# Patient Record
Sex: FEMALE | Race: WHITE | NOT HISPANIC OR LATINO | Employment: FULL TIME | URBAN - METROPOLITAN AREA
[De-identification: names, ages, dates, MRNs, and addresses within clinical notes are randomized per-mention and may not be internally consistent; named-entity substitution may affect disease eponyms.]

---

## 2017-02-24 ENCOUNTER — ANESTHESIA (OUTPATIENT)
Dept: PERIOP | Facility: HOSPITAL | Age: 32
End: 2017-02-24

## 2017-02-24 ENCOUNTER — HOSPITAL ENCOUNTER (OUTPATIENT)
Facility: HOSPITAL | Age: 32
Setting detail: HOSPITAL OUTPATIENT SURGERY
Discharge: HOME | End: 2017-02-24
Attending: OBSTETRICS & GYNECOLOGY | Admitting: OBSTETRICS & GYNECOLOGY

## 2017-02-24 ENCOUNTER — ANESTHESIA EVENT (OUTPATIENT)
Dept: PERIOP | Facility: HOSPITAL | Age: 32
End: 2017-02-24

## 2017-02-24 VITALS
DIASTOLIC BLOOD PRESSURE: 60 MMHG | HEART RATE: 68 BPM | BODY MASS INDEX: 24.52 KG/M2 | WEIGHT: 143.6 LBS | HEIGHT: 64 IN | RESPIRATION RATE: 18 BRPM | TEMPERATURE: 98 F | OXYGEN SATURATION: 100 % | SYSTOLIC BLOOD PRESSURE: 105 MMHG

## 2017-02-24 DIAGNOSIS — O02.1 MISSED AB: ICD-10-CM

## 2017-02-24 LAB
ABO GROUP BLD: NORMAL
BASOPHILS # BLD AUTO: 0.03 10*3/MM3 (ref 0–0.2)
BASOPHILS NFR BLD AUTO: 0.4 % (ref 0–1.5)
BLD GP AB SCN SERPL QL: NEGATIVE
DEPRECATED RDW RBC AUTO: 43.3 FL (ref 37–54)
EOSINOPHIL # BLD AUTO: 0.14 10*3/MM3 (ref 0–0.7)
EOSINOPHIL NFR BLD AUTO: 1.7 % (ref 0.3–6.2)
ERYTHROCYTE [DISTWIDTH] IN BLOOD BY AUTOMATED COUNT: 13.2 % (ref 11.7–13)
HCT VFR BLD AUTO: 36 % (ref 35.6–45.5)
HGB BLD-MCNC: 12.2 G/DL (ref 11.9–15.5)
IMM GRANULOCYTES # BLD: 0.03 10*3/MM3 (ref 0–0.03)
IMM GRANULOCYTES NFR BLD: 0.4 % (ref 0–0.5)
LYMPHOCYTES # BLD AUTO: 1.91 10*3/MM3 (ref 0.9–4.8)
LYMPHOCYTES NFR BLD AUTO: 22.9 % (ref 19.6–45.3)
MCH RBC QN AUTO: 30.4 PG (ref 26.9–32)
MCHC RBC AUTO-ENTMCNC: 33.9 G/DL (ref 32.4–36.3)
MCV RBC AUTO: 89.8 FL (ref 80.5–98.2)
MONOCYTES # BLD AUTO: 0.72 10*3/MM3 (ref 0.2–1.2)
MONOCYTES NFR BLD AUTO: 8.6 % (ref 5–12)
NEUTROPHILS # BLD AUTO: 5.52 10*3/MM3 (ref 1.9–8.1)
NEUTROPHILS NFR BLD AUTO: 66 % (ref 42.7–76)
PLATELET # BLD AUTO: 246 10*3/MM3 (ref 140–500)
PMV BLD AUTO: 11.2 FL (ref 6–12)
RBC # BLD AUTO: 4.01 10*6/MM3 (ref 3.9–5.2)
RH BLD: POSITIVE
WBC NRBC COR # BLD: 8.35 10*3/MM3 (ref 4.5–10.7)

## 2017-02-24 PROCEDURE — 25010000002 KETOROLAC TROMETHAMINE PER 15 MG: Performed by: NURSE ANESTHETIST, CERTIFIED REGISTERED

## 2017-02-24 PROCEDURE — 88305 TISSUE EXAM BY PATHOLOGIST: CPT | Performed by: OBSTETRICS & GYNECOLOGY

## 2017-02-24 PROCEDURE — 85025 COMPLETE CBC W/AUTO DIFF WBC: CPT | Performed by: OBSTETRICS & GYNECOLOGY

## 2017-02-24 PROCEDURE — 25010000002 ONDANSETRON PER 1 MG: Performed by: NURSE ANESTHETIST, CERTIFIED REGISTERED

## 2017-02-24 PROCEDURE — 25010000002 DEXAMETHASONE PER 1 MG: Performed by: NURSE ANESTHETIST, CERTIFIED REGISTERED

## 2017-02-24 PROCEDURE — 25010000002 FENTANYL CITRATE (PF) 100 MCG/2ML SOLUTION: Performed by: NURSE ANESTHETIST, CERTIFIED REGISTERED

## 2017-02-24 PROCEDURE — 86850 RBC ANTIBODY SCREEN: CPT

## 2017-02-24 PROCEDURE — 25010000002 PROPOFOL 10 MG/ML EMULSION: Performed by: NURSE ANESTHETIST, CERTIFIED REGISTERED

## 2017-02-24 PROCEDURE — 25010000002 HYDROMORPHONE PER 4 MG: Performed by: NURSE ANESTHETIST, CERTIFIED REGISTERED

## 2017-02-24 PROCEDURE — 86901 BLOOD TYPING SEROLOGIC RH(D): CPT

## 2017-02-24 PROCEDURE — 25010000002 MIDAZOLAM PER 1 MG: Performed by: ANESTHESIOLOGY

## 2017-02-24 PROCEDURE — 86900 BLOOD TYPING SEROLOGIC ABO: CPT

## 2017-02-24 RX ORDER — LIDOCAINE HYDROCHLORIDE 10 MG/ML
INJECTION, SOLUTION EPIDURAL; INFILTRATION; INTRACAUDAL; PERINEURAL AS NEEDED
Status: DISCONTINUED | OUTPATIENT
Start: 2017-02-24 | End: 2017-02-24 | Stop reason: HOSPADM

## 2017-02-24 RX ORDER — FENTANYL CITRATE 50 UG/ML
INJECTION, SOLUTION INTRAMUSCULAR; INTRAVENOUS AS NEEDED
Status: DISCONTINUED | OUTPATIENT
Start: 2017-02-24 | End: 2017-02-24 | Stop reason: SURG

## 2017-02-24 RX ORDER — LIDOCAINE HYDROCHLORIDE 10 MG/ML
5 INJECTION, SOLUTION EPIDURAL; INFILTRATION; INTRACAUDAL; PERINEURAL ONCE
Status: DISCONTINUED | OUTPATIENT
Start: 2017-02-24 | End: 2017-02-24 | Stop reason: HOSPADM

## 2017-02-24 RX ORDER — HYDROMORPHONE HYDROCHLORIDE 1 MG/ML
0.5 INJECTION, SOLUTION INTRAMUSCULAR; INTRAVENOUS; SUBCUTANEOUS
Status: DISCONTINUED | OUTPATIENT
Start: 2017-02-24 | End: 2017-02-24 | Stop reason: HOSPADM

## 2017-02-24 RX ORDER — SODIUM CHLORIDE, SODIUM LACTATE, POTASSIUM CHLORIDE, CALCIUM CHLORIDE 600; 310; 30; 20 MG/100ML; MG/100ML; MG/100ML; MG/100ML
9 INJECTION, SOLUTION INTRAVENOUS CONTINUOUS
Status: DISCONTINUED | OUTPATIENT
Start: 2017-02-24 | End: 2017-02-24 | Stop reason: HOSPADM

## 2017-02-24 RX ORDER — KETOROLAC TROMETHAMINE 30 MG/ML
INJECTION, SOLUTION INTRAMUSCULAR; INTRAVENOUS AS NEEDED
Status: DISCONTINUED | OUTPATIENT
Start: 2017-02-24 | End: 2017-02-24 | Stop reason: SURG

## 2017-02-24 RX ORDER — MAGNESIUM HYDROXIDE 1200 MG/15ML
LIQUID ORAL AS NEEDED
Status: DISCONTINUED | OUTPATIENT
Start: 2017-02-24 | End: 2017-02-24 | Stop reason: HOSPADM

## 2017-02-24 RX ORDER — PROMETHAZINE HYDROCHLORIDE 25 MG/ML
12.5 INJECTION, SOLUTION INTRAMUSCULAR; INTRAVENOUS ONCE AS NEEDED
Status: DISCONTINUED | OUTPATIENT
Start: 2017-02-24 | End: 2017-02-24 | Stop reason: HOSPADM

## 2017-02-24 RX ORDER — OXYCODONE AND ACETAMINOPHEN 7.5; 325 MG/1; MG/1
1 TABLET ORAL ONCE AS NEEDED
Status: DISCONTINUED | OUTPATIENT
Start: 2017-02-24 | End: 2017-02-24 | Stop reason: HOSPADM

## 2017-02-24 RX ORDER — FAMOTIDINE 10 MG/ML
20 INJECTION, SOLUTION INTRAVENOUS ONCE
Status: COMPLETED | OUTPATIENT
Start: 2017-02-24 | End: 2017-02-24

## 2017-02-24 RX ORDER — DIPHENHYDRAMINE HYDROCHLORIDE 50 MG/ML
12.5 INJECTION INTRAMUSCULAR; INTRAVENOUS
Status: DISCONTINUED | OUTPATIENT
Start: 2017-02-24 | End: 2017-02-24 | Stop reason: HOSPADM

## 2017-02-24 RX ORDER — SODIUM CHLORIDE 0.9 % (FLUSH) 0.9 %
1-10 SYRINGE (ML) INJECTION AS NEEDED
Status: DISCONTINUED | OUTPATIENT
Start: 2017-02-24 | End: 2017-02-24 | Stop reason: HOSPADM

## 2017-02-24 RX ORDER — HYDROMORPHONE HYDROCHLORIDE 1 MG/ML
0.25 INJECTION, SOLUTION INTRAMUSCULAR; INTRAVENOUS; SUBCUTANEOUS
Status: DISCONTINUED | OUTPATIENT
Start: 2017-02-24 | End: 2017-02-24 | Stop reason: HOSPADM

## 2017-02-24 RX ORDER — LABETALOL HYDROCHLORIDE 5 MG/ML
5 INJECTION, SOLUTION INTRAVENOUS
Status: DISCONTINUED | OUTPATIENT
Start: 2017-02-24 | End: 2017-02-24 | Stop reason: HOSPADM

## 2017-02-24 RX ORDER — MIDAZOLAM HYDROCHLORIDE 1 MG/ML
2 INJECTION INTRAMUSCULAR; INTRAVENOUS
Status: DISCONTINUED | OUTPATIENT
Start: 2017-02-24 | End: 2017-02-24 | Stop reason: HOSPADM

## 2017-02-24 RX ORDER — LIDOCAINE HYDROCHLORIDE 20 MG/ML
INJECTION, SOLUTION INFILTRATION; PERINEURAL AS NEEDED
Status: DISCONTINUED | OUTPATIENT
Start: 2017-02-24 | End: 2017-02-24 | Stop reason: SURG

## 2017-02-24 RX ORDER — NALOXONE HCL 0.4 MG/ML
0.2 VIAL (ML) INJECTION AS NEEDED
Status: DISCONTINUED | OUTPATIENT
Start: 2017-02-24 | End: 2017-02-24 | Stop reason: HOSPADM

## 2017-02-24 RX ORDER — PROPOFOL 10 MG/ML
VIAL (ML) INTRAVENOUS AS NEEDED
Status: DISCONTINUED | OUTPATIENT
Start: 2017-02-24 | End: 2017-02-24 | Stop reason: SURG

## 2017-02-24 RX ORDER — DEXAMETHASONE SODIUM PHOSPHATE 10 MG/ML
INJECTION INTRAMUSCULAR; INTRAVENOUS AS NEEDED
Status: DISCONTINUED | OUTPATIENT
Start: 2017-02-24 | End: 2017-02-24 | Stop reason: SURG

## 2017-02-24 RX ORDER — ONDANSETRON 2 MG/ML
INJECTION INTRAMUSCULAR; INTRAVENOUS AS NEEDED
Status: DISCONTINUED | OUTPATIENT
Start: 2017-02-24 | End: 2017-02-24 | Stop reason: SURG

## 2017-02-24 RX ORDER — PROMETHAZINE HYDROCHLORIDE 25 MG/1
12.5 TABLET ORAL ONCE AS NEEDED
Status: DISCONTINUED | OUTPATIENT
Start: 2017-02-24 | End: 2017-02-24 | Stop reason: HOSPADM

## 2017-02-24 RX ORDER — FLUMAZENIL 0.1 MG/ML
0.2 INJECTION INTRAVENOUS AS NEEDED
Status: DISCONTINUED | OUTPATIENT
Start: 2017-02-24 | End: 2017-02-24 | Stop reason: HOSPADM

## 2017-02-24 RX ORDER — HYDRALAZINE HYDROCHLORIDE 20 MG/ML
5 INJECTION INTRAMUSCULAR; INTRAVENOUS
Status: DISCONTINUED | OUTPATIENT
Start: 2017-02-24 | End: 2017-02-24 | Stop reason: HOSPADM

## 2017-02-24 RX ORDER — FENTANYL CITRATE 50 UG/ML
50 INJECTION, SOLUTION INTRAMUSCULAR; INTRAVENOUS
Status: DISCONTINUED | OUTPATIENT
Start: 2017-02-24 | End: 2017-02-24 | Stop reason: HOSPADM

## 2017-02-24 RX ORDER — HYDROCODONE BITARTRATE AND ACETAMINOPHEN 7.5; 325 MG/1; MG/1
1 TABLET ORAL ONCE AS NEEDED
Status: COMPLETED | OUTPATIENT
Start: 2017-02-24 | End: 2017-02-24

## 2017-02-24 RX ORDER — ONDANSETRON 2 MG/ML
4 INJECTION INTRAMUSCULAR; INTRAVENOUS ONCE AS NEEDED
Status: DISCONTINUED | OUTPATIENT
Start: 2017-02-24 | End: 2017-02-24 | Stop reason: HOSPADM

## 2017-02-24 RX ORDER — PROMETHAZINE HYDROCHLORIDE 25 MG/1
25 SUPPOSITORY RECTAL ONCE AS NEEDED
Status: DISCONTINUED | OUTPATIENT
Start: 2017-02-24 | End: 2017-02-24 | Stop reason: HOSPADM

## 2017-02-24 RX ORDER — PROMETHAZINE HYDROCHLORIDE 25 MG/1
25 TABLET ORAL ONCE AS NEEDED
Status: DISCONTINUED | OUTPATIENT
Start: 2017-02-24 | End: 2017-02-24 | Stop reason: HOSPADM

## 2017-02-24 RX ORDER — MIDAZOLAM HYDROCHLORIDE 1 MG/ML
1 INJECTION INTRAMUSCULAR; INTRAVENOUS
Status: DISCONTINUED | OUTPATIENT
Start: 2017-02-24 | End: 2017-02-24 | Stop reason: HOSPADM

## 2017-02-24 RX ADMIN — DEXAMETHASONE SODIUM PHOSPHATE 8 MG: 10 INJECTION INTRAMUSCULAR; INTRAVENOUS at 12:26

## 2017-02-24 RX ADMIN — PROPOFOL 200 MG: 10 INJECTION, EMULSION INTRAVENOUS at 12:18

## 2017-02-24 RX ADMIN — FENTANYL CITRATE 50 MCG: 50 INJECTION INTRAMUSCULAR; INTRAVENOUS at 12:18

## 2017-02-24 RX ADMIN — FAMOTIDINE 20 MG: 10 INJECTION, SOLUTION INTRAVENOUS at 11:16

## 2017-02-24 RX ADMIN — MIDAZOLAM 1 MG: 1 INJECTION INTRAMUSCULAR; INTRAVENOUS at 11:17

## 2017-02-24 RX ADMIN — HYDROCODONE BITARTRATE AND ACETAMINOPHEN 1 TABLET: 7.5; 325 TABLET ORAL at 13:17

## 2017-02-24 RX ADMIN — KETOROLAC TROMETHAMINE 30 MG: 30 INJECTION, SOLUTION INTRAMUSCULAR; INTRAVENOUS at 12:34

## 2017-02-24 RX ADMIN — ONDANSETRON 8 MG: 2 INJECTION INTRAMUSCULAR; INTRAVENOUS at 12:26

## 2017-02-24 RX ADMIN — HYDROMORPHONE HYDROCHLORIDE 0.5 MG: 1 INJECTION, SOLUTION INTRAMUSCULAR; INTRAVENOUS; SUBCUTANEOUS at 12:57

## 2017-02-24 RX ADMIN — SODIUM CHLORIDE, POTASSIUM CHLORIDE, SODIUM LACTATE AND CALCIUM CHLORIDE 9 ML/HR: 600; 310; 30; 20 INJECTION, SOLUTION INTRAVENOUS at 11:16

## 2017-02-24 RX ADMIN — LIDOCAINE HYDROCHLORIDE 100 MG: 20 INJECTION, SOLUTION INFILTRATION; PERINEURAL at 12:18

## 2017-02-24 NOTE — PLAN OF CARE
Problem: Patient Care Overview (Adult)  Goal: Plan of Care Review  Outcome: Outcome(s) achieved Date Met:  02/24/17 02/24/17 1424   Coping/Psychosocial Response Interventions   Plan Of Care Reviewed With patient;spouse   Outcome Evaluation   Outcome Summary/Follow up Plan READY FIR DISCHARGE

## 2017-02-24 NOTE — PLAN OF CARE
Problem: Patient Care Overview (Adult)  Goal: Discharge Needs Assessment  Outcome: Outcome(s) achieved Date Met:  02/24/17

## 2017-02-24 NOTE — ANESTHESIA PREPROCEDURE EVALUATION
Anesthesia Evaluation     Patient summary reviewed and Nursing notes reviewed   no history of anesthetic complications:  NPO Status: > 8 hours   Airway   Mallampati: II  TM distance: >3 FB  Neck ROM: full  no difficulty expected  Dental - normal exam     Pulmonary - negative pulmonary ROS and normal exam    breath sounds clear to auscultation  (-) rhonchi, decreased breath sounds, wheezes, rales, stridor  Cardiovascular - negative cardio ROS and normal exam    Rhythm: regular  Rate: normal    (-) murmur, weak pulses, friction rub, systolic click, carotid bruits, JVD, peripheral edema      Neuro/Psych- negative ROS  GI/Hepatic/Renal/Endo - negative ROS     Musculoskeletal (-) negative ROS    Abdominal  - normal exam    Abdomen: soft.   Substance History - negative use     OB/GYN    (+) Pregnant,         Other - negative ROS                                   Anesthesia Plan    ASA 2     general     intravenous induction   Anesthetic plan and risks discussed with patient and spouse/significant other.

## 2017-02-24 NOTE — PLAN OF CARE
Problem: Perioperative Period (Adult)  Goal: Signs and Symptoms of Listed Potential Problems Will be Absent or Manageable (Perioperative Period)  Outcome: Outcome(s) achieved Date Met:  02/24/17 02/24/17 1424   Perioperative Period   Problems Assessed (Perioperative Period) all   Problems Present (Perioperative Period) none

## 2017-02-24 NOTE — PLAN OF CARE
Problem: Patient Care Overview (Adult)  Goal: Adult Individualization and Mutuality  Outcome: Outcome(s) achieved Date Met:  02/24/17

## 2017-02-24 NOTE — PLAN OF CARE
Problem: Patient Care Overview (Adult)  Goal: Plan of Care Review  Outcome: Ongoing (interventions implemented as appropriate)    02/24/17 1003   Coping/Psychosocial Response Interventions   Plan Of Care Reviewed With patient   Patient Care Overview   Progress no change       Goal: Adult Individualization and Mutuality  Outcome: Ongoing (interventions implemented as appropriate)    02/24/17 1003   Individualization   Patient Specific Preferences none       Goal: Discharge Needs Assessment  Outcome: Ongoing (interventions implemented as appropriate)    02/24/17 1003   Discharge Needs Assessment   Concerns To Be Addressed no discharge needs identified         Problem: Perioperative Period (Adult)  Goal: Signs and Symptoms of Listed Potential Problems Will be Absent or Manageable (Perioperative Period)  Outcome: Ongoing (interventions implemented as appropriate)    02/24/17 1003   Perioperative Period   Problems Assessed (Perioperative Period) all   Problems Present (Perioperative Period) none

## 2017-02-24 NOTE — ANESTHESIA PROCEDURE NOTES
Airway  Urgency: elective    Date/Time: 2/24/2017 12:37 PM  End Time:2/24/2017 12:37 PM  Airway not difficult    General Information and Staff    Patient location during procedure: OR  Anesthesiologist: DENISA KEYS  CRNA: FABIANA THIBODEAUX    Indications and Patient Condition  Indications for airway management: airway protection    Preoxygenated: yes  MILS maintained throughout  Mask difficulty assessment: 1 - vent by mask    Final Airway Details  Final airway type: supraglottic airway      Successful airway: oropharyngeal  Size 4    Number of attempts at approach: 1

## 2017-02-24 NOTE — ANESTHESIA POSTPROCEDURE EVALUATION
Patient: Cecille BILLS    Procedure Summary     Date Anesthesia Start Anesthesia Stop Room / Location    02/24/17 1213 1249  [facility] / [facility]       Procedure Diagnosis Surgeon Provider    DILATATION AND CURETTAGE WITH SUCTION--6.1 weeks gestation (N/A Vagina) No diagnosis on file. MD Khari Drew MD          Anesthesia Type: general  Last vitals  /67 (02/24/17 1340)    Temp      Pulse 63 (02/24/17 1340)   Resp 16 (02/24/17 1340)    SpO2 100 % (02/24/17 1340)      Post Anesthesia Care and Evaluation    Patient location during evaluation: bedside  Patient participation: complete - patient participated  Level of consciousness: awake and alert  Pain management: adequate  Airway patency: patent  Anesthetic complications: No anesthetic complications    Cardiovascular status: acceptable  Respiratory status: acceptable  Hydration status: acceptable

## 2017-02-24 NOTE — H&P
Chart rev'd. No changes. Rh positive. Has 6wk loss on sono. No ca x2 now. No vb. Retrovert uterus. First loss. dw risks and recovery issues. zia

## 2017-02-25 LAB
CYTO UR: NORMAL
LAB AP CASE REPORT: NORMAL
Lab: NORMAL
PATH REPORT.FINAL DX SPEC: NORMAL
PATH REPORT.GROSS SPEC: NORMAL

## 2017-02-25 NOTE — OP NOTE
Date of Procedure:  2017     PREOPERATIVE DIAGNOSIS: A 31-year-old  1 at 6 weeks with missed .     POSTOPERATIVE DIAGNOSIS: A 31-year-old  1 at 6 weeks with missed .     PROCEDURES PERFORMED:  1.  Suction dilatation and curettage.   2.  Paracervical block.     SURGEON: Maureen York MD      ASSISTANT: None.      COMPLICATIONS: None.      ANESTHESIA: General.      ESTIMATED BLOOD LOSS: 200 mL.      FINDINGS: Obvious products of conception.     DESCRIPTION OF PROCEDURE: The patient was taken to the operating room where general anesthesia was obtained without difficulty. She was placed in the dorsal lithotomy position in universal Huber stirrups and prepped and draped in the standard sterile fashion. Paracervical block was performed using 10 mL of 1% plain lidocaine at 3 and 9 oclock positions. The cervix was easily dilated. A #7 curved suction cannula was inserted into the cavity with obvious products of conception returned. Several passes were performed until no further products were noted.  A #1 curette was used. A gritty texture was noted throughout and the procedure was terminated. All needle, lap, and sponge counts were correct.          KATARINA Bruno:wqaas  D:   2017 13:57:34  T:   2017 19:42:30  Job ID:   [id number]  Document ID:   [id number]  cc:

## 2017-04-14 DIAGNOSIS — Z00.00 HEALTH CARE MAINTENANCE: ICD-10-CM

## 2017-04-14 DIAGNOSIS — Z00.00 ANNUAL PHYSICAL EXAM: Primary | ICD-10-CM

## 2017-04-15 LAB
ALBUMIN SERPL-MCNC: 4.6 G/DL (ref 3.5–5.2)
ALBUMIN/GLOB SERPL: 1.5 G/DL
ALP SERPL-CCNC: 79 U/L (ref 39–117)
ALT SERPL-CCNC: 40 U/L (ref 1–33)
AST SERPL-CCNC: 45 U/L (ref 1–32)
BASOPHILS # BLD AUTO: 0.1 10*3/MM3 (ref 0–0.2)
BASOPHILS NFR BLD AUTO: 1.4 % (ref 0–1.5)
BILIRUB SERPL-MCNC: 0.4 MG/DL (ref 0.1–1.2)
BUN SERPL-MCNC: 13 MG/DL (ref 6–20)
BUN/CREAT SERPL: 14.9 (ref 7–25)
CALCIUM SERPL-MCNC: 9.8 MG/DL (ref 8.6–10.5)
CHLORIDE SERPL-SCNC: 103 MMOL/L (ref 98–107)
CHOLEST SERPL-MCNC: 195 MG/DL (ref 0–200)
CO2 SERPL-SCNC: 27.6 MMOL/L (ref 22–29)
CREAT SERPL-MCNC: 0.87 MG/DL (ref 0.57–1)
EOSINOPHIL # BLD AUTO: 0.22 10*3/MM3 (ref 0–0.7)
EOSINOPHIL NFR BLD AUTO: 3.1 % (ref 0.3–6.2)
ERYTHROCYTE [DISTWIDTH] IN BLOOD BY AUTOMATED COUNT: 14.2 % (ref 11.7–13)
GLOBULIN SER CALC-MCNC: 3 GM/DL
GLUCOSE SERPL-MCNC: 83 MG/DL (ref 65–99)
HCT VFR BLD AUTO: 44 % (ref 35.6–45.5)
HDLC SERPL-MCNC: 88 MG/DL (ref 40–60)
HGB BLD-MCNC: 13.7 G/DL (ref 11.9–15.5)
IMM GRANULOCYTES # BLD: 0.03 10*3/MM3 (ref 0–0.03)
IMM GRANULOCYTES NFR BLD: 0.4 % (ref 0–0.5)
LDLC SERPL CALC-MCNC: 94 MG/DL (ref 0–100)
LYMPHOCYTES # BLD AUTO: 2.39 10*3/MM3 (ref 0.9–4.8)
LYMPHOCYTES NFR BLD AUTO: 33.9 % (ref 19.6–45.3)
MCH RBC QN AUTO: 29.7 PG (ref 26.9–32)
MCHC RBC AUTO-ENTMCNC: 31.1 G/DL (ref 32.4–36.3)
MCV RBC AUTO: 95.4 FL (ref 80.5–98.2)
MONOCYTES # BLD AUTO: 0.64 10*3/MM3 (ref 0.2–1.2)
MONOCYTES NFR BLD AUTO: 9.1 % (ref 5–12)
NEUTROPHILS # BLD AUTO: 3.66 10*3/MM3 (ref 1.9–8.1)
NEUTROPHILS NFR BLD AUTO: 52.1 % (ref 42.7–76)
NRBC BLD AUTO-RTO: 0 /100 WBC (ref 0–0)
PLATELET # BLD AUTO: 319 10*3/MM3 (ref 140–500)
POTASSIUM SERPL-SCNC: 4.8 MMOL/L (ref 3.5–5.2)
PROT SERPL-MCNC: 7.6 G/DL (ref 6–8.5)
RBC # BLD AUTO: 4.61 10*6/MM3 (ref 3.9–5.2)
SODIUM SERPL-SCNC: 142 MMOL/L (ref 136–145)
TRIGL SERPL-MCNC: 64 MG/DL (ref 0–150)
TSH SERPL DL<=0.005 MIU/L-ACNC: 3.05 MIU/ML (ref 0.27–4.2)
VLDLC SERPL CALC-MCNC: 12.8 MG/DL (ref 5–40)
WBC # BLD AUTO: 7.04 10*3/MM3 (ref 4.5–10.7)

## 2017-04-24 ENCOUNTER — OFFICE VISIT (OUTPATIENT)
Dept: INTERNAL MEDICINE | Facility: CLINIC | Age: 32
End: 2017-04-24

## 2017-04-24 VITALS
OXYGEN SATURATION: 96 % | HEIGHT: 64 IN | BODY MASS INDEX: 25.4 KG/M2 | HEART RATE: 77 BPM | SYSTOLIC BLOOD PRESSURE: 108 MMHG | WEIGHT: 148.8 LBS | DIASTOLIC BLOOD PRESSURE: 60 MMHG | TEMPERATURE: 98.3 F

## 2017-04-24 DIAGNOSIS — R74.8 ELEVATED LIVER ENZYMES: ICD-10-CM

## 2017-04-24 DIAGNOSIS — F41.9 ANXIETY: ICD-10-CM

## 2017-04-24 DIAGNOSIS — R10.30 LOWER ABDOMINAL PAIN: Primary | ICD-10-CM

## 2017-04-24 LAB
B-HCG UR QL: NEGATIVE
BILIRUB BLD-MCNC: NEGATIVE MG/DL
CLARITY, POC: CLEAR
COLOR UR: YELLOW
GLUCOSE UR STRIP-MCNC: NEGATIVE MG/DL
INTERNAL NEGATIVE CONTROL: NEGATIVE
INTERNAL POSITIVE CONTROL: POSITIVE
KETONES UR QL: NEGATIVE
LEUKOCYTE EST, POC: NEGATIVE
Lab: NORMAL
NITRITE UR-MCNC: NEGATIVE MG/ML
PH UR: 5.5 [PH] (ref 5–8)
PROT UR STRIP-MCNC: NEGATIVE MG/DL
RBC # UR STRIP: ABNORMAL /UL
SP GR UR: 1 (ref 1–1.03)
UROBILINOGEN UR QL: NORMAL

## 2017-04-24 PROCEDURE — 81025 URINE PREGNANCY TEST: CPT | Performed by: NURSE PRACTITIONER

## 2017-04-24 PROCEDURE — 81003 URINALYSIS AUTO W/O SCOPE: CPT | Performed by: NURSE PRACTITIONER

## 2017-04-24 PROCEDURE — 99214 OFFICE O/P EST MOD 30 MIN: CPT | Performed by: NURSE PRACTITIONER

## 2017-04-24 RX ORDER — IBUPROFEN 600 MG/1
600 TABLET ORAL EVERY 6 HOURS PRN
COMMUNITY
End: 2019-07-29 | Stop reason: HOSPADM

## 2017-04-24 RX ORDER — ALPRAZOLAM 0.25 MG/1
0.25 TABLET ORAL 2 TIMES DAILY PRN
Qty: 6 TABLET | Refills: 0 | Status: SHIPPED | OUTPATIENT
Start: 2017-04-24 | End: 2018-05-01 | Stop reason: SDUPTHER

## 2017-04-24 NOTE — PROGRESS NOTES
Subjective   Cecille BILLS is a 32 y.o. female who states that she started with lower abd. And back pian on the left side. She urinated this morning and felt like she still needed to go, slight u/a irritation. Pt. Is trying to become pregnant.     History of Present Illness   The patient woke up at 4 am with left sided back pain. Felt like she needed to go to the bathroom, urinated but felt like she still needed to go. Has a burning/ache in her bladder currently. Denies blood.   Had a missed  in February. Due for her period next week. She is currently trying to get pregnant.   Also will discuss labs.   Also with abd pain every 6 months. JAS WNL. Saw GI, nothing added. Notes mainly with tomato based products, better with omeprazole with this.   Notes anxiety with flying recently.     Up 10 lbs since last year. She is exercising.   The following portions of the patient's history were reviewed and updated as appropriate: allergies, current medications, past family history, past medical history, past social history, past surgical history and problem list.    Review of Systems   Constitutional: Negative.    Respiratory: Negative.    Genitourinary: Positive for flank pain and pelvic pain.   Psychiatric/Behavioral: Negative for dysphoric mood. The patient is nervous/anxious.    All other systems reviewed and are negative.      Objective   Physical Exam   Constitutional: She appears well-developed and well-nourished.   Neck: Normal range of motion. Neck supple. No thyromegaly present.   Cardiovascular: Normal rate, regular rhythm, normal heart sounds and intact distal pulses.    Pulmonary/Chest: Effort normal and breath sounds normal.   Abdominal: There is tenderness in the suprapubic area. There is no CVA tenderness.   Skin: Skin is warm and dry.   Psychiatric: She has a normal mood and affect. Her behavior is normal. Judgment and thought content normal.       Assessment/Plan   Cecille was seen today for  urinary tract infection.    Diagnoses and all orders for this visit:    Lower abdominal pain  -     POCT urinalysis dipstick, automated  -     POCT pregnancy, urine    Anxiety  -     ALPRAZolam (XANAX) 0.25 MG tablet; Take 1 tablet by mouth 2 (Two) Times a Day As Needed for Anxiety.    Elevated liver enzymes  -     Hepatitis Panel, Acute; Future      1. Left flank pain- watch for now, hydrate well. Call if symptoms persist or worsen.   2. Flight anxiety- will give PRN xanax, but needs to clear with OB before taking.   3. Elevated liver enzymes- Recheck hepatic function panel in 1 month, if still elevated will check RUQ US.

## 2017-04-26 ENCOUNTER — TELEPHONE (OUTPATIENT)
Dept: INTERNAL MEDICINE | Facility: CLINIC | Age: 32
End: 2017-04-26

## 2017-04-26 DIAGNOSIS — R30.9 URINARY PAIN: Primary | ICD-10-CM

## 2017-04-26 LAB
BACTERIA UR CULT: NO GROWTH
BACTERIA UR CULT: NORMAL

## 2017-04-26 NOTE — TELEPHONE ENCOUNTER
I CALLED PT. AND SHE STATES THAT SHE DOES NOT HAVE FLANK PAIN BUT THAT SHE IS IN PAIN AND DISCOMFORT WITH U/A SYMPTOMS AND WANTS TO BE REF TO THE SPECIALIST AND WANTS TO KNOW WHEN SHE CAN GET IN, I TOLD HER THAT I WILL SPEAK TO OUR REF SPECIALIST AND CALL HER BACK TO SEE WHEN WE CAN GET HER IN TO BE SEEN.

## 2017-04-26 NOTE — TELEPHONE ENCOUNTER
----- Message from SHARRI Griffiths sent at 4/26/2017 11:25 AM EDT -----  If pt denies flank pain and has complaints just of urinary frequency and urinary discomfort in the vaginal area she needs to be referred to Urogyn.

## 2017-04-27 ENCOUNTER — TELEPHONE (OUTPATIENT)
Dept: INTERNAL MEDICINE | Facility: CLINIC | Age: 32
End: 2017-04-27

## 2017-04-27 NOTE — TELEPHONE ENCOUNTER
I CALLED AND SCHEDULED AN APPT. FOR TODAY WITH WILLI FRANCOIS. I RELAYED TO PT, I ASKED HER TO CALL OUR OFFICE BACK AFTER HER APPT. TO LET US KNOW AFTER SHE SEES DR. GARCIA TODAY, IF SHE STILL NEEDS THE APPT. WITH DR. GE, IF NOT, WE NEED TO CANCEL THE REFERRAL, I CALLED DR. LIGHT OFFICE AND THEY CANNOT SEE HER UNTIL POSSIBLY MAY 9TH, PT. DID NOT WANT TO WAIT UNTIL THEN, SHE WANTS AN APPT. TO BE SEEN TODAY OR SHE SAID THAT SHE WILL NEED TO GO TO THE ER TODAY.   PT. STATES THAT SHE IS HAVING ONLY URINARY SYMPTOMS NOW AND SHE IS IN PAIN.

## 2017-05-17 DIAGNOSIS — R74.8 ELEVATED LIVER ENZYMES: ICD-10-CM

## 2017-05-20 LAB
HAV IGM SERPL QL IA: NEGATIVE
HBV CORE IGM SERPL QL IA: NEGATIVE
HBV SURFACE AG SERPL QL IA: NEGATIVE
HCV AB S/CO SERPL IA: <0.1 S/CO RATIO (ref 0–0.9)

## 2017-05-25 LAB
ALBUMIN SERPL-MCNC: 4.6 G/DL (ref 3.5–5.5)
ALP SERPL-CCNC: 79 IU/L (ref 39–117)
ALT SERPL-CCNC: 25 IU/L (ref 0–32)
AST SERPL-CCNC: 29 IU/L (ref 0–40)
BILIRUB DIRECT SERPL-MCNC: 0.06 MG/DL (ref 0–0.4)
BILIRUB SERPL-MCNC: <0.2 MG/DL (ref 0–1.2)
PROT SERPL-MCNC: 7.1 G/DL (ref 6–8.5)
WRITTEN AUTHORIZATION: NORMAL

## 2017-05-26 ENCOUNTER — TELEPHONE (OUTPATIENT)
Dept: INTERNAL MEDICINE | Facility: CLINIC | Age: 32
End: 2017-05-26

## 2017-10-23 DIAGNOSIS — R74.8 ELEVATED LIVER ENZYMES: Primary | ICD-10-CM

## 2017-10-24 LAB
ALBUMIN SERPL-MCNC: 4.6 G/DL (ref 3.5–5.2)
ALBUMIN/GLOB SERPL: 1.6 G/DL
ALP SERPL-CCNC: 83 U/L (ref 39–117)
ALT SERPL-CCNC: 13 U/L (ref 1–33)
AST SERPL-CCNC: 14 U/L (ref 1–32)
BILIRUB SERPL-MCNC: 0.4 MG/DL (ref 0.1–1.2)
BUN SERPL-MCNC: 12 MG/DL (ref 6–20)
BUN/CREAT SERPL: 13.6 (ref 7–25)
CALCIUM SERPL-MCNC: 10 MG/DL (ref 8.6–10.5)
CHLORIDE SERPL-SCNC: 102 MMOL/L (ref 98–107)
CO2 SERPL-SCNC: 26.9 MMOL/L (ref 22–29)
CREAT SERPL-MCNC: 0.88 MG/DL (ref 0.57–1)
GFR SERPLBLD CREATININE-BSD FMLA CKD-EPI: 75 ML/MIN/1.73
GFR SERPLBLD CREATININE-BSD FMLA CKD-EPI: 91 ML/MIN/1.73
GLOBULIN SER CALC-MCNC: 2.8 GM/DL
GLUCOSE SERPL-MCNC: 88 MG/DL (ref 65–99)
POTASSIUM SERPL-SCNC: 4.7 MMOL/L (ref 3.5–5.2)
PROT SERPL-MCNC: 7.4 G/DL (ref 6–8.5)
SODIUM SERPL-SCNC: 142 MMOL/L (ref 136–145)

## 2017-10-26 ENCOUNTER — OFFICE VISIT (OUTPATIENT)
Dept: INTERNAL MEDICINE | Facility: CLINIC | Age: 32
End: 2017-10-26

## 2017-10-26 VITALS
SYSTOLIC BLOOD PRESSURE: 92 MMHG | OXYGEN SATURATION: 97 % | HEIGHT: 64 IN | DIASTOLIC BLOOD PRESSURE: 68 MMHG | HEART RATE: 93 BPM | WEIGHT: 149 LBS | BODY MASS INDEX: 25.44 KG/M2

## 2017-10-26 DIAGNOSIS — R74.8 ELEVATED LIVER ENZYMES: Primary | ICD-10-CM

## 2017-10-26 DIAGNOSIS — E03.9 HYPOTHYROIDISM, UNSPECIFIED TYPE: ICD-10-CM

## 2017-10-26 PROBLEM — N30.10 INTERSTITIAL CYSTITIS: Status: ACTIVE | Noted: 2017-10-26

## 2017-10-26 PROCEDURE — 90686 IIV4 VACC NO PRSV 0.5 ML IM: CPT | Performed by: NURSE PRACTITIONER

## 2017-10-26 PROCEDURE — 99213 OFFICE O/P EST LOW 20 MIN: CPT | Performed by: NURSE PRACTITIONER

## 2017-10-26 PROCEDURE — 90471 IMMUNIZATION ADMIN: CPT | Performed by: NURSE PRACTITIONER

## 2017-10-26 RX ORDER — LEVOTHYROXINE SODIUM 50 MCG
TABLET ORAL
COMMUNITY
Start: 2017-10-10 | End: 2018-05-29

## 2017-10-26 RX ORDER — CHLORHEXIDINE GLUCONATE 0.12 MG/ML
RINSE ORAL
Refills: 1 | COMMUNITY
Start: 2017-08-09 | End: 2018-05-01 | Stop reason: HOSPADM

## 2017-10-26 NOTE — PROGRESS NOTES
Subjective   Cecille Devi is a 32 y.o. female here for a follow up on elevated liver enzymes.    History of Present Illness   The patient is here today to F/U on liver enzymes. She is feeling well.     Still with flight anxiety.   Saw urogyn for IC, yeni did not really help much. She states marshmallow root has really helped.     GYN- had another miscarriage early this month. Has been seeing GYN a lot. Trying progesterone now. She recently had genetic counseling, to get results today.   G-2 Spont abort-2  TSH has been checked recently and was close to 3. Recently increased to 50 mcg daily, recheck TSH soon.   The following portions of the patient's history were reviewed and updated as appropriate: allergies, current medications, past family history, past medical history, past social history, past surgical history and problem list.    Review of Systems   Constitutional: Negative.    Respiratory: Negative.    Cardiovascular: Negative.    Psychiatric/Behavioral: Negative.        Objective   Physical Exam   Constitutional: She appears well-developed and well-nourished.   Neck: Normal range of motion. Neck supple. No thyromegaly present.   Cardiovascular: Normal rate, regular rhythm, normal heart sounds and intact distal pulses.    Pulmonary/Chest: Effort normal and breath sounds normal.   Skin: Skin is warm and dry.   Psychiatric: She has a normal mood and affect. Her behavior is normal. Judgment and thought content normal.     Vitals:    10/26/17 0932   Pulse: 93   SpO2: 97%       Current Outpatient Prescriptions:   •  chlorhexidine (PERIDEX) 0.12 % solution, USE BID ON A COTTON BALL, Disp: , Rfl: 1  •  ibuprofen (ADVIL,MOTRIN) 600 MG tablet, Take 600 mg by mouth Every 6 (Six) Hours As Needed for Mild Pain (1-3)., Disp: , Rfl:   •  MULTIPLE VITAMINS PO, Take 1 tablet by mouth daily., Disp: , Rfl:   •  progesterone (PROMETRIUM) 200 MG capsule, TK 1 C PO QD BEGINNING CYCLE DAY 17 UTD, Disp: , Rfl: 3  •  SYNTHROID 50  MCG tablet, , Disp: , Rfl:   •  ALPRAZolam (XANAX) 0.25 MG tablet, Take 1 tablet by mouth 2 (Two) Times a Day As Needed for Anxiety., Disp: 6 tablet, Rfl: 0  Orders Only on 10/23/2017   Component Date Value Ref Range Status   • Glucose 10/24/2017 88  65 - 99 mg/dL Final   • BUN 10/24/2017 12  6 - 20 mg/dL Final   • Creatinine 10/24/2017 0.88  0.57 - 1.00 mg/dL Final   • eGFR Non  Am 10/24/2017 75  >60 mL/min/1.73 Final   • eGFR African Am 10/24/2017 91  >60 mL/min/1.73 Final   • BUN/Creatinine Ratio 10/24/2017 13.6  7.0 - 25.0 Final   • Sodium 10/24/2017 142  136 - 145 mmol/L Final   • Potassium 10/24/2017 4.7  3.5 - 5.2 mmol/L Final   • Chloride 10/24/2017 102  98 - 107 mmol/L Final   • Total CO2 10/24/2017 26.9  22.0 - 29.0 mmol/L Final   • Calcium 10/24/2017 10.0  8.6 - 10.5 mg/dL Final   • Total Protein 10/24/2017 7.4  6.0 - 8.5 g/dL Final   • Albumin 10/24/2017 4.60  3.50 - 5.20 g/dL Final   • Globulin 10/24/2017 2.8  gm/dL Final   • A/G Ratio 10/24/2017 1.6  g/dL Final   • Total Bilirubin 10/24/2017 0.4  0.1 - 1.2 mg/dL Final   • Alkaline Phosphatase 10/24/2017 83  39 - 117 U/L Final   • AST (SGOT) 10/24/2017 14  1 - 32 U/L Final   • ALT (SGPT) 10/24/2017 13  1 - 33 U/L Final       Assessment/Plan   There are no diagnoses linked to this encounter.    1. Elevated liver enzymes- resolved, continue same.   2. Hypothyroidism- currently managed per GYN.     Flu vaccine- 10/26/2017  Will see back in April for annual physical and lab work.

## 2017-11-08 PROBLEM — E83.110 HEREDITARY HEMOCHROMATOSIS (HCC): Status: ACTIVE | Noted: 2017-11-08

## 2017-11-08 PROBLEM — Z15.89 HETEROZYGOUS FOR MTHFR GENE MUTATION: Status: ACTIVE | Noted: 2017-11-08

## 2017-11-09 ENCOUNTER — TELEPHONE (OUTPATIENT)
Dept: INTERNAL MEDICINE | Facility: CLINIC | Age: 32
End: 2017-11-09

## 2017-11-09 DIAGNOSIS — Z15.89 HETEROZYGOUS FOR MTHFR GENE MUTATION: Primary | ICD-10-CM

## 2017-11-09 DIAGNOSIS — E83.110 HEREDITARY HEMOCHROMATOSIS (HCC): ICD-10-CM

## 2017-11-09 NOTE — TELEPHONE ENCOUNTER
PER ELBA PEMBERTON, PT. NEEDS THESE LABS DRAWN, I ENTERED LABS IN, MY CHART MESSAGE SENT TO PT. AND SD LEFT A MESSAGE FOR PT BY PHONE.

## 2017-11-17 LAB
FERRITIN SERPL-MCNC: 80.87 NG/ML (ref 13–150)
HCYS SERPL-SCNC: 7.1 UMOL/L (ref 0–15)

## 2018-01-21 ENCOUNTER — APPOINTMENT (OUTPATIENT)
Dept: CT IMAGING | Facility: HOSPITAL | Age: 33
End: 2018-01-21

## 2018-01-21 ENCOUNTER — HOSPITAL ENCOUNTER (EMERGENCY)
Facility: HOSPITAL | Age: 33
Discharge: HOME | End: 2018-01-21
Attending: EMERGENCY MEDICINE | Admitting: EMERGENCY MEDICINE

## 2018-01-21 VITALS
RESPIRATION RATE: 16 BRPM | HEIGHT: 65 IN | OXYGEN SATURATION: 99 % | WEIGHT: 145 LBS | DIASTOLIC BLOOD PRESSURE: 74 MMHG | TEMPERATURE: 98.2 F | BODY MASS INDEX: 24.16 KG/M2 | SYSTOLIC BLOOD PRESSURE: 122 MMHG | HEART RATE: 113 BPM

## 2018-01-21 DIAGNOSIS — K52.9 GASTROENTERITIS: Primary | ICD-10-CM

## 2018-01-21 LAB
ALBUMIN SERPL-MCNC: 4.9 G/DL (ref 3.5–5.2)
ALBUMIN/GLOB SERPL: 1.6 G/DL
ALP SERPL-CCNC: 88 U/L (ref 39–117)
ALT SERPL W P-5'-P-CCNC: 18 U/L (ref 1–33)
ANION GAP SERPL CALCULATED.3IONS-SCNC: 12.8 MMOL/L
AST SERPL-CCNC: 19 U/L (ref 1–32)
BASOPHILS # BLD AUTO: 0.02 10*3/MM3 (ref 0–0.2)
BASOPHILS NFR BLD AUTO: 0.1 % (ref 0–1.5)
BILIRUB SERPL-MCNC: 0.4 MG/DL (ref 0.1–1.2)
BUN BLD-MCNC: 14 MG/DL (ref 6–20)
BUN/CREAT SERPL: 17.9 (ref 7–25)
CALCIUM SPEC-SCNC: 9.3 MG/DL (ref 8.6–10.5)
CHLORIDE SERPL-SCNC: 104 MMOL/L (ref 98–107)
CO2 SERPL-SCNC: 25.2 MMOL/L (ref 22–29)
CREAT BLD-MCNC: 0.78 MG/DL (ref 0.57–1)
DEPRECATED RDW RBC AUTO: 42.9 FL (ref 37–54)
EOSINOPHIL # BLD AUTO: 0.18 10*3/MM3 (ref 0–0.7)
EOSINOPHIL NFR BLD AUTO: 1.3 % (ref 0.3–6.2)
ERYTHROCYTE [DISTWIDTH] IN BLOOD BY AUTOMATED COUNT: 13.2 % (ref 11.7–13)
GFR SERPL CREATININE-BSD FRML MDRD: 86 ML/MIN/1.73
GLOBULIN UR ELPH-MCNC: 3.1 GM/DL
GLUCOSE BLD-MCNC: 87 MG/DL (ref 65–99)
HCG INTACT+B SERPL-ACNC: <0.5 MIU/ML
HCT VFR BLD AUTO: 43.8 % (ref 35.6–45.5)
HGB BLD-MCNC: 14.7 G/DL (ref 11.9–15.5)
IMM GRANULOCYTES # BLD: 0.04 10*3/MM3 (ref 0–0.03)
IMM GRANULOCYTES NFR BLD: 0.3 % (ref 0–0.5)
LIPASE SERPL-CCNC: 43 U/L (ref 13–60)
LYMPHOCYTES # BLD AUTO: 0.79 10*3/MM3 (ref 0.9–4.8)
LYMPHOCYTES NFR BLD AUTO: 5.7 % (ref 19.6–45.3)
MCH RBC QN AUTO: 30.1 PG (ref 26.9–32)
MCHC RBC AUTO-ENTMCNC: 33.6 G/DL (ref 32.4–36.3)
MCV RBC AUTO: 89.8 FL (ref 80.5–98.2)
MONOCYTES # BLD AUTO: 0.97 10*3/MM3 (ref 0.2–1.2)
MONOCYTES NFR BLD AUTO: 7 % (ref 5–12)
NEUTROPHILS # BLD AUTO: 11.84 10*3/MM3 (ref 1.9–8.1)
NEUTROPHILS NFR BLD AUTO: 85.6 % (ref 42.7–76)
PLATELET # BLD AUTO: 257 10*3/MM3 (ref 140–500)
PMV BLD AUTO: 11.3 FL (ref 6–12)
POTASSIUM BLD-SCNC: 4.3 MMOL/L (ref 3.5–5.2)
PROT SERPL-MCNC: 8 G/DL (ref 6–8.5)
RBC # BLD AUTO: 4.88 10*6/MM3 (ref 3.9–5.2)
SODIUM BLD-SCNC: 142 MMOL/L (ref 136–145)
WBC NRBC COR # BLD: 13.84 10*3/MM3 (ref 4.5–10.7)

## 2018-01-21 PROCEDURE — 80053 COMPREHEN METABOLIC PANEL: CPT | Performed by: PHYSICIAN ASSISTANT

## 2018-01-21 PROCEDURE — 96374 THER/PROPH/DIAG INJ IV PUSH: CPT

## 2018-01-21 PROCEDURE — 0 IOPAMIDOL 61 % SOLUTION: Performed by: EMERGENCY MEDICINE

## 2018-01-21 PROCEDURE — 83690 ASSAY OF LIPASE: CPT | Performed by: PHYSICIAN ASSISTANT

## 2018-01-21 PROCEDURE — 25010000002 ONDANSETRON PER 1 MG: Performed by: PHYSICIAN ASSISTANT

## 2018-01-21 PROCEDURE — 85025 COMPLETE CBC W/AUTO DIFF WBC: CPT | Performed by: PHYSICIAN ASSISTANT

## 2018-01-21 PROCEDURE — 74177 CT ABD & PELVIS W/CONTRAST: CPT

## 2018-01-21 PROCEDURE — 84702 CHORIONIC GONADOTROPIN TEST: CPT | Performed by: PHYSICIAN ASSISTANT

## 2018-01-21 PROCEDURE — 96375 TX/PRO/DX INJ NEW DRUG ADDON: CPT

## 2018-01-21 PROCEDURE — 99283 EMERGENCY DEPT VISIT LOW MDM: CPT

## 2018-01-21 RX ORDER — SODIUM CHLORIDE 0.9 % (FLUSH) 0.9 %
10 SYRINGE (ML) INJECTION AS NEEDED
Status: DISCONTINUED | OUTPATIENT
Start: 2018-01-21 | End: 2018-01-21 | Stop reason: HOSPADM

## 2018-01-21 RX ORDER — DICYCLOMINE HYDROCHLORIDE 10 MG/1
20 CAPSULE ORAL ONCE
Status: COMPLETED | OUTPATIENT
Start: 2018-01-21 | End: 2018-01-21

## 2018-01-21 RX ORDER — ONDANSETRON 4 MG/1
4 TABLET, ORALLY DISINTEGRATING ORAL 4 TIMES DAILY PRN
Qty: 12 TABLET | Refills: 0 | Status: SHIPPED | OUTPATIENT
Start: 2018-01-21 | End: 2019-07-29 | Stop reason: HOSPADM

## 2018-01-21 RX ORDER — FAMOTIDINE 10 MG/ML
20 INJECTION, SOLUTION INTRAVENOUS ONCE
Status: COMPLETED | OUTPATIENT
Start: 2018-01-21 | End: 2018-01-21

## 2018-01-21 RX ORDER — ONDANSETRON 2 MG/ML
4 INJECTION INTRAMUSCULAR; INTRAVENOUS ONCE
Status: COMPLETED | OUTPATIENT
Start: 2018-01-21 | End: 2018-01-21

## 2018-01-21 RX ADMIN — FAMOTIDINE 20 MG: 10 INJECTION, SOLUTION INTRAVENOUS at 10:25

## 2018-01-21 RX ADMIN — IOPAMIDOL 85 ML: 612 INJECTION, SOLUTION INTRAVENOUS at 12:19

## 2018-01-21 RX ADMIN — ONDANSETRON 4 MG: 2 INJECTION INTRAMUSCULAR; INTRAVENOUS at 10:25

## 2018-01-21 RX ADMIN — SODIUM CHLORIDE 1000 ML: 9 INJECTION, SOLUTION INTRAVENOUS at 10:37

## 2018-01-21 RX ADMIN — DICYCLOMINE HYDROCHLORIDE 20 MG: 10 CAPSULE ORAL at 12:00

## 2018-01-21 NOTE — ED PROVIDER NOTES
The patient presents complaining of abd pain since yesterday. Pt also c/o nausea and diarrhea x3 since this AM. Pt denies dysuria, frequency, cough, or generalized myalgias. Pt denies any sick contacts. Pt states that she was seen several years ago for similar symptoms, and was diagnosed with a UTI, and a Hx of abd pain with acidic or spicy foods.     Physical Exam:  Patient is nontoxic appearing  Lungs/cardiovascular: CTAB, CV -RRR, no murmur  Abdomen: soft, mid-epigastric tenderness, no rebound, no CVA tenderness.     Labs:   WBC 13.84, Lipase 43, HCG quant <50    Plan to evaluate a CT scan.     I supervised care provided by the midlevel provider.  We have discussed this patient's history, physical exam, and treatment plan.  I have reviewed the note and personally saw and examined the patient and agree with the plan of care.    Documentation assistance provided by diana Dumont for Dr. Carroll.  Information recorded by the scribe was done at my direction and has been verified and validated by me.       Joseph Dumont  01/21/18 [id number]       Tong Carroll MD  01/21/18 [id number]

## 2018-01-21 NOTE — ED PROVIDER NOTES
"EMERGENCY DEPARTMENT ENCOUNTER    CHIEF COMPLAINT  Chief Complaint: Abd pain  History given by:Pt  History limited by:Nothing  Room Number: 05/05  PMD: SHARRI Wang      HPI:  Pt is a 32 y.o. female who presents with upper abd pain, onset last night. Pt also c/o N/Dx3 this morning. Pt describes her pain as \"cramping.\" Pt denies blood in stool, vomiting, or urinary sx. She denies pregnancy and reports her last known menstrual period was 2 weeks ago. She states she has been taking fertility medication for the last 6-8 months. Pt reports a hx of interstitial cystitis.     Duration: last night  Timing:constant  Location:upper abd  Radiation:none  Quality:\"cramping\"  Intensity/Severity:mild  Progression:unchanged  Associated Symptoms:N/Dx3 this morning  Aggravating Factors:none  Alleviating Factors:none  Previous Episodes:Pt reports a hx of interstitial cystitis  Treatment before arrival:Pt reports she has been taking fertility medication for the last 6-8 months    MEDICAL RECORD REVIEW     Pt has a hx of interstitial cystitis. She shes a urogynecologist  for her interstitial cystitis.     PAST MEDICAL HISTORY  Active Ambulatory Problems     Diagnosis Date Noted   • Abdominal pain 03/11/2016   • Epigastric pain 03/11/2016   • Nausea 03/11/2016   • Anxiety 04/24/2017   • Elevated liver enzymes 04/24/2017   • Interstitial cystitis 10/26/2017   • Hypothyroidism 10/26/2017   • Heterozygous for MTHFR gene mutation 11/08/2017   • Hereditary hemochromatosis 11/08/2017     Resolved Ambulatory Problems     Diagnosis Date Noted   • No Resolved Ambulatory Problems     Past Medical History:   Diagnosis Date   • Abdominal pain    • Acute epigastric pain    • Contraception    • Miscarriage    • Nausea        PAST SURGICAL HISTORY  Past Surgical History:   Procedure Laterality Date   • ADENOIDECTOMY     • D&C WITH SUCTION N/A 2/24/2017    Procedure: DILATATION AND CURETTAGE WITH SUCTION--6.1 weeks gestation;  Surgeon: Maureen GILLIAM" MD Jaylen;  Location: [facility] MAIN OR;  Service:    • KNEE ARTHROSCOPY     • TONSILLECTOMY         FAMILY HISTORY  Family History   Problem Relation Age of Onset   • Migraines Mother    • Hypertension Mother    • Stroke Maternal Grandmother        SOCIAL HISTORY  Social History     Social History   • Marital status:      Spouse name: DANIEL   • Number of children: 0   • Years of education: 4     Occupational History   •  [employer]     FULLTIME     Social History Main Topics   • Smoking status: Never Smoker   • Smokeless tobacco: Never Used   • Alcohol use Yes      Comment: occasional drinker, SOCAILLY    • Drug use: No   • Sexual activity: Not on file      Comment: oral contraceptive use     Other Topics Concern   • Not on file     Social History Narrative    ** Merged History Encounter **                ALLERGIES  Review of patient's allergies indicates no known allergies.    REVIEW OF SYSTEMS  Review of Systems   Constitutional: Negative for fever.   HENT: Negative for sore throat.    Eyes: Negative.    Respiratory: Negative for cough and shortness of breath.    Cardiovascular: Negative for chest pain.   Gastrointestinal: Positive for abdominal pain (upper), diarrhea (x3 this morning) and nausea. Negative for vomiting.   Genitourinary: Negative for dysuria.   Musculoskeletal: Negative for neck pain.   Skin: Negative for rash.   Allergic/Immunologic: Negative.    Neurological: Negative for weakness, numbness and headaches.   Hematological: Negative.    Psychiatric/Behavioral: Negative.    All other systems reviewed and are negative.      PHYSICAL EXAM  ED Triage Vitals   Temp Heart Rate Resp BP SpO2   01/21/18 0934 01/21/18 0934 01/21/18 0934 -- 01/21/18 0934   98.2 °F (36.8 °C) 113 16  99 %      Temp src Heart Rate Source Patient Position BP Location FiO2 (%)   01/21/18 0934 01/21/18 0934 -- -- --   Tympanic Monitor          Physical Exam   Constitutional: She is oriented to person, place, and time and  well-developed, well-nourished, and in no distress. No distress.   HENT:   Head: Normocephalic and atraumatic.   Mouth/Throat: Oropharynx is clear and moist.   Eyes: EOM are normal.   Neck: Normal range of motion. Neck supple.   Cardiovascular: Normal rate and regular rhythm.    Pulmonary/Chest: Effort normal and breath sounds normal. No respiratory distress. She has no wheezes. She exhibits no tenderness.   Abdominal: Soft. She exhibits no distension. There is tenderness (minimal) in the epigastric area. There is no rebound.   Musculoskeletal: Normal range of motion. She exhibits no edema.   Lymphadenopathy:     She has no cervical adenopathy.   Neurological: She is alert and oriented to person, place, and time.   Skin: Skin is warm and dry. No rash noted. No pallor.   Psychiatric: Mood, memory, affect and judgment normal.   Nursing note and vitals reviewed.      LAB RESULTS  Recent Results (from the past 24 hour(s))   Comprehensive Metabolic Panel    Collection Time: 01/21/18 10:37 AM   Result Value Ref Range    Glucose 87 65 - 99 mg/dL    BUN 14 6 - 20 mg/dL    Creatinine 0.78 0.57 - 1.00 mg/dL    Sodium 142 136 - 145 mmol/L    Potassium 4.3 3.5 - 5.2 mmol/L    Chloride 104 98 - 107 mmol/L    CO2 25.2 22.0 - 29.0 mmol/L    Calcium 9.3 8.6 - 10.5 mg/dL    Total Protein 8.0 6.0 - 8.5 g/dL    Albumin 4.90 3.50 - 5.20 g/dL    ALT (SGPT) 18 1 - 33 U/L    AST (SGOT) 19 1 - 32 U/L    Alkaline Phosphatase 88 39 - 117 U/L    Total Bilirubin 0.4 0.1 - 1.2 mg/dL    eGFR Non African Amer 86 >60 mL/min/1.73    Globulin 3.1 gm/dL    A/G Ratio 1.6 g/dL    BUN/Creatinine Ratio 17.9 7.0 - 25.0    Anion Gap 12.8 mmol/L   Lipase    Collection Time: 01/21/18 10:37 AM   Result Value Ref Range    Lipase 43 13 - 60 U/L   CBC Auto Differential    Collection Time: 01/21/18 10:37 AM   Result Value Ref Range    WBC 13.84 (H) 4.50 - 10.70 10*3/mm3    RBC 4.88 3.90 - 5.20 10*6/mm3    Hemoglobin 14.7 11.9 - 15.5 g/dL    Hematocrit 43.8 35.6 -  45.5 %    MCV 89.8 80.5 - 98.2 fL    MCH 30.1 26.9 - 32.0 pg    MCHC 33.6 32.4 - 36.3 g/dL    RDW 13.2 (H) 11.7 - 13.0 %    RDW-SD 42.9 37.0 - 54.0 fl    MPV 11.3 6.0 - 12.0 fL    Platelets 257 140 - 500 10*3/mm3    Neutrophil % 85.6 (H) 42.7 - 76.0 %    Lymphocyte % 5.7 (L) 19.6 - 45.3 %    Monocyte % 7.0 5.0 - 12.0 %    Eosinophil % 1.3 0.3 - 6.2 %    Basophil % 0.1 0.0 - 1.5 %    Immature Grans % 0.3 0.0 - 0.5 %    Neutrophils, Absolute 11.84 (H) 1.90 - 8.10 10*3/mm3    Lymphocytes, Absolute 0.79 (L) 0.90 - 4.80 10*3/mm3    Monocytes, Absolute 0.97 0.20 - 1.20 10*3/mm3    Eosinophils, Absolute 0.18 0.00 - 0.70 10*3/mm3    Basophils, Absolute 0.02 0.00 - 0.20 10*3/mm3    Immature Grans, Absolute 0.04 (H) 0.00 - 0.03 10*3/mm3   hCG, Quantitative, Pregnancy    Collection Time: 01/21/18 10:37 AM   Result Value Ref Range    HCG Quantitative <0.50 mIU/mL       I ordered the above labs and reviewed the results    RADIOLOGY  CT Abdomen Pelvis With Contrast    (Results Pending)       I ordered the above noted radiological studies and reviewed the images on the PACS system.     COURSE & MEDICAL DECISION MAKING  Pertinent Labs and Imaging studies that were ordered and reviewed are noted above.  Results were reviewed/discussed with the patient and they were also made aware of online assess.  Pt also made aware that some labs, such as cultures, will not be resulted during ER visit and follow up with PMD is necessary.       PROGRESS AND CONSULTS    Progress Notes:    ED Course     1115  Reviewed pt's history and workup with Dr. Carroll.  After a bedside evaluation; Dr Carroll agrees with the plan of care    1252  Rechecked pt. Pt is resting comfortably. Informed pt of the results of her CT abd/pelvis which shows gastroenteritis. D/w pt the plan to discharge home and that she can follow up with GI for any future concerns, but otherwise follow up with PCP. Pt understands and agrees with plan. All questions answered.     1300  The  "patient was never given her Bentyl.  On reassessment, she felt improved and declined the medicine.  The patient's history, physical exam, and lab findings were discussed with the physician, who also performed a face to face history and physical exam.  I discussed all results and noted any abnormalities with patient.  Discussed absoute need to recheck abnormalities with their family physician.  I answered any of the patient's questions.  Discussed plan for discharge, as there is no emergent indication for admission.  Pt is agreeable and understands need for follow up and repeat testing.  Pt is aware that discharge does not mean that nothing is wrong but it indicates no emergency is present and they must continue care with their family physician.  Pt is discharged with instructions to follow up with primary care doctor to have their blood pressure rechecked.     MEDICATIONS GIVEN IN ER  Medications   sodium chloride 0.9 % flush 10 mL (not administered)   dicyclomine (BENTYL) capsule 20 mg (not administered)   sodium chloride 0.9 % bolus 1,000 mL (1,000 mL Intravenous New Bag 1/21/18 1037)   ondansetron (ZOFRAN) injection 4 mg (4 mg Intravenous Given 1/21/18 1025)   famotidine (PEPCID) injection 20 mg (20 mg Intravenous Given 1/21/18 1025)   iopamidol (ISOVUE-300) 61 % injection 100 mL (85 mL Intravenous Given 1/21/18 1219)       /74  Pulse 113  Temp 98.2 °F (36.8 °C) (Tympanic)   Resp 16  Ht 165.1 cm (65\")  Wt 65.8 kg (145 lb)  SpO2 99%  BMI 24.13 kg/m2      DIAGNOSIS  Final diagnoses:   Gastroenteritis       FOLLOW UP   Gita Lorenzo, APRN  [address]  [address] [address] [phone]            RX     Medication List      New Prescriptions          ondansetron ODT 4 MG disintegrating tablet   Commonly known as:  ZOFRAN-ODT   Take 1 tablet by mouth 4 (Four) Times a Day As Needed for Nausea or   Vomiting.           Risks, benefits, alternatives discussed with patient.  Pt consents to " treatment and agrees to follow up with PMD tomorrow for further care and any other prescriptions.       Documentation assistance provided by diana Lott for Samantha Coles PA-C.  Information recorded by the scribe was done at my direction and has been verified and validated by me.  Electronically signed by Ishmael Lott on 1/21/2018 at time 1:05 PM             Ishmael Lott  01/21/18 [id number]       Samantha Coles PA-C  01/21/18 0811

## 2018-01-21 NOTE — ED NOTES
Notified ct that patient has new iv and can now go get scan     Marina Najera, RN  01/21/18 [id number]

## 2018-01-21 NOTE — DISCHARGE INSTRUCTIONS
PLEASE READ AND REVIEW ALL DISCHARGE INSTRUCTIONS.     Please follow up with your primary care physician for any further evaluation/treatment and further management of your blood pressure.     Recheck in emergency department for any worsening and/or concerning symptoms.     Take all prescribed medicine as written and continue chronic medication.    Generous fluid hydration.  Advance diet as tolerated.

## 2018-01-23 ENCOUNTER — TELEPHONE (OUTPATIENT)
Dept: SOCIAL WORK | Facility: HOSPITAL | Age: 33
End: 2018-01-23

## 2018-03-29 ENCOUNTER — OFFICE VISIT (OUTPATIENT)
Dept: ENDOCRINOLOGY | Age: 33
End: 2018-03-29

## 2018-03-29 VITALS
BODY MASS INDEX: 24.32 KG/M2 | OXYGEN SATURATION: 99 % | DIASTOLIC BLOOD PRESSURE: 62 MMHG | HEART RATE: 72 BPM | SYSTOLIC BLOOD PRESSURE: 118 MMHG | HEIGHT: 65 IN | WEIGHT: 146 LBS

## 2018-03-29 DIAGNOSIS — E03.8 OTHER SPECIFIED HYPOTHYROIDISM: Primary | ICD-10-CM

## 2018-03-29 LAB
T4 FREE SERPL-MCNC: 1.03 NG/DL (ref 0.93–1.7)
TSH SERPL DL<=0.005 MIU/L-ACNC: 0.22 MIU/ML (ref 0.27–4.2)

## 2018-03-29 PROCEDURE — 99205 OFFICE O/P NEW HI 60 MIN: CPT | Performed by: INTERNAL MEDICINE

## 2018-03-29 RX ORDER — QUINIDINE SULFATE 200 MG
TABLET ORAL
COMMUNITY
End: 2019-07-29 | Stop reason: HOSPADM

## 2018-03-29 RX ORDER — PRASTERONE (DHEA) 25 MG
CAPSULE ORAL
COMMUNITY
End: 2019-07-29 | Stop reason: HOSPADM

## 2018-03-29 NOTE — PROGRESS NOTES
Chief Complaint   Patient presents with   • Hypothyroidism   NEW PATIENT APPOINTMENT/ HYPOTHYROIDISM    Cecille Devi 33 y.o. WF presents as a new patient for the evaluation of Hypothyroidism. Consulted by .   Pt was started on medication in June 2017.   Pt has been issues with fertility, so they were aiming for TSH to be below 2.5. She has been started on levothyroxine 25 mcg oral daily and then increased to 50 but when got off she went back to 25 mcg oral daily.   But when TSH were still off she is off medication.   Currently she has been on no medication for about 1 - 2 weeks.     She complains of decent energy levels, she reports some weight gain and her weight goes up and down, normal BM, no hair loss, no increased sweating, some dry skin, sleep is good. No c/o heat intolerance and c/o cold intolerance. No c/o tremors, racing of heart and no eye symptoms.   Denied c/o difficulty breathing, swallowing and change in voice.   No family hx of thyroid disease.     Menarche at age 13 years ago, periods are regular, LMP - March 6th, pt has been trying to conceive for about a year now, she is seeing [facility] endocrine in [place].       I have reviewed the patient's allergies, medicines, past medical hx, family hx and social hx in detail.    Past Medical History:   Diagnosis Date   • Abdominal pain    • Acute epigastric pain    • Contraception     managerd by Sky Gonzalez    • Miscarriage    • Nausea        Family History   Problem Relation Age of Onset   • Migraines Mother    • Hypertension Mother    • Stroke Maternal Grandmother        Social History     Social History   • Marital status:      Spouse name: DANIEL   • Number of children: 0   • Years of education: 4     Occupational History   •  [employer]     FULLTIME     Social History Main Topics   • Smoking status: Never Smoker   • Smokeless tobacco: Never Used   • Alcohol use Yes      Comment: occasional drinker, SOCAILLY    • Drug use: No   •  Sexual activity: Not on file      Comment: oral contraceptive use     Other Topics Concern   • Not on file     Social History Narrative    ** Merged History Encounter **                No Known Allergies      Current Outpatient Prescriptions:   •  ALPRAZolam (XANAX) 0.25 MG tablet, Take 1 tablet by mouth 2 (Two) Times a Day As Needed for Anxiety., Disp: 6 tablet, Rfl: 0  •  aspirin 81 MG chewable tablet, Chew 81 mg Daily., Disp: , Rfl:   •  Coenzyme Q10 (CO Q-10) 400 MG capsule, Take  by mouth., Disp: , Rfl:   •  DHEA 25 MG capsule, Take  by mouth 3 (Three) Times a Day With Meals., Disp: , Rfl:   •  FOLIC ACID PO, Take  by mouth., Disp: , Rfl:   •  ibuprofen (ADVIL,MOTRIN) 600 MG tablet, Take 600 mg by mouth Every 6 (Six) Hours As Needed for Mild Pain (1-3)., Disp: , Rfl:   •  ondansetron ODT (ZOFRAN-ODT) 4 MG disintegrating tablet, Take 1 tablet by mouth 4 (Four) Times a Day As Needed for Nausea or Vomiting., Disp: 12 tablet, Rfl: 0  •  Prenatal Vit-Fe Fumarate-FA (PRENATAL, CLASSIC, VITAMIN) 28-0.8 MG tablet tablet, Take  by mouth Daily., Disp: , Rfl:   •  progesterone (PROMETRIUM) 200 MG capsule, TK 1 C PO QD BEGINNING CYCLE DAY 17 UTD, Disp: , Rfl: 3  •  chlorhexidine (PERIDEX) 0.12 % solution, USE BID ON A COTTON BALL, Disp: , Rfl: 1  •  SYNTHROID 50 MCG tablet, , Disp: , Rfl:      Review of Systems   Constitutional: Negative for fever.   HENT: Negative for facial swelling, nosebleeds, trouble swallowing and voice change.    Eyes: Negative for pain and redness.   Respiratory: Negative for shortness of breath and wheezing.    Cardiovascular: Negative for palpitations and leg swelling.   Gastrointestinal: Negative for abdominal pain, diarrhea and vomiting.   Endocrine: Negative for polydipsia and polyuria.   Genitourinary: Negative for decreased urine volume and frequency.   Musculoskeletal: Negative for joint swelling and neck pain.   Skin: Negative for rash.   Allergic/Immunologic: Negative for  "immunocompromised state.   Neurological: Positive for light-headedness. Negative for seizures and facial asymmetry.   Hematological: Bruises/bleeds easily.   Psychiatric/Behavioral: Negative for agitation and confusion.       Objective:    /62   Pulse 72   Ht 165.1 cm (65\")   Wt 66.2 kg (146 lb)   SpO2 99%   BMI 24.30 kg/m²     Physical Exam   Constitutional: She is oriented to person, place, and time. She appears well-nourished.   HENT:   Head: Normocephalic and atraumatic.   Eyes: Conjunctivae and EOM are normal. No scleral icterus.   Neck: Normal range of motion. Neck supple. No thyromegaly present.   Cardiovascular: Normal rate and normal heart sounds.  Exam reveals no friction rub.    No murmur heard.  Pulmonary/Chest: Effort normal and breath sounds normal. No stridor. She has no wheezes. She has no rales.   Abdominal: Soft. Bowel sounds are normal. She exhibits no distension. There is no tenderness.   Musculoskeletal: She exhibits no edema or tenderness.   Lymphadenopathy:     She has no cervical adenopathy.   Neurological: She is alert and oriented to person, place, and time.   Skin: Skin is warm and dry. She is not diaphoretic.   Psychiatric: She has a normal mood and affect.   Vitals reviewed.      Results Review:    I reviewed the patient's new clinical results.     Admission on 01/21/2018, Discharged on 01/21/2018   Component Date Value Ref Range Status   • Glucose 01/21/2018 87  65 - 99 mg/dL Final   • BUN 01/21/2018 14  6 - 20 mg/dL Final   • Creatinine 01/21/2018 0.78  0.57 - 1.00 mg/dL Final   • Sodium 01/21/2018 142  136 - 145 mmol/L Final   • Potassium 01/21/2018 4.3  3.5 - 5.2 mmol/L Final   • Chloride 01/21/2018 104  98 - 107 mmol/L Final   • CO2 01/21/2018 25.2  22.0 - 29.0 mmol/L Final   • Calcium 01/21/2018 9.3  8.6 - 10.5 mg/dL Final   • Total Protein 01/21/2018 8.0  6.0 - 8.5 g/dL Final   • Albumin 01/21/2018 4.90  3.50 - 5.20 g/dL Final   • ALT (SGPT) 01/21/2018 18  1 - 33 U/L " Final   • AST (SGOT) 01/21/2018 19  1 - 32 U/L Final   • Alkaline Phosphatase 01/21/2018 88  39 - 117 U/L Final   • Total Bilirubin 01/21/2018 0.4  0.1 - 1.2 mg/dL Final   • eGFR Non African Amer 01/21/2018 86  >60 mL/min/1.73 Final   • Globulin 01/21/2018 3.1  gm/dL Final   • A/G Ratio 01/21/2018 1.6  g/dL Final   • BUN/Creatinine Ratio 01/21/2018 17.9  7.0 - 25.0 Final   • Anion Gap 01/21/2018 12.8  mmol/L Final   • Lipase 01/21/2018 43  13 - 60 U/L Final   • WBC 01/21/2018 13.84* 4.50 - 10.70 10*3/mm3 Final   • RBC 01/21/2018 4.88  3.90 - 5.20 10*6/mm3 Final   • Hemoglobin 01/21/2018 14.7  11.9 - 15.5 g/dL Final   • Hematocrit 01/21/2018 43.8  35.6 - 45.5 % Final   • MCV 01/21/2018 89.8  80.5 - 98.2 fL Final   • MCH 01/21/2018 30.1  26.9 - 32.0 pg Final   • MCHC 01/21/2018 33.6  32.4 - 36.3 g/dL Final   • RDW 01/21/2018 13.2* 11.7 - 13.0 % Final   • RDW-SD 01/21/2018 42.9  37.0 - 54.0 fl Final   • MPV 01/21/2018 11.3  6.0 - 12.0 fL Final   • Platelets 01/21/2018 257  140 - 500 10*3/mm3 Final   • Neutrophil % 01/21/2018 85.6* 42.7 - 76.0 % Final   • Lymphocyte % 01/21/2018 5.7* 19.6 - 45.3 % Final   • Monocyte % 01/21/2018 7.0  5.0 - 12.0 % Final   • Eosinophil % 01/21/2018 1.3  0.3 - 6.2 % Final   • Basophil % 01/21/2018 0.1  0.0 - 1.5 % Final   • Immature Grans % 01/21/2018 0.3  0.0 - 0.5 % Final   • Neutrophils, Absolute 01/21/2018 11.84* 1.90 - 8.10 10*3/mm3 Final   • Lymphocytes, Absolute 01/21/2018 0.79* 0.90 - 4.80 10*3/mm3 Final   • Monocytes, Absolute 01/21/2018 0.97  0.20 - 1.20 10*3/mm3 Final   • Eosinophils, Absolute 01/21/2018 0.18  0.00 - 0.70 10*3/mm3 Final   • Basophils, Absolute 01/21/2018 0.02  0.00 - 0.20 10*3/mm3 Final   • Immature Grans, Absolute 01/21/2018 0.04* 0.00 - 0.03 10*3/mm3 Final   • HCG Quantitative 01/21/2018 <0.50  mIU/mL Final         Cecille was seen today for hypothyroidism.    Diagnoses and all orders for this visit:    Other specified hypothyroidism  -     TSH  -     T4,  Free  -     TSH; Future  -     T4, Free; Future      Hypothyroidism resulting in fertility issues  Will check TSH, free T4  Based on the levels would consider levothyroxin replacement.  Explained to the patient that she needs to take the medication on empty stomach.  Since 25 µg and 50 µg of levothyroxine did not help in the improvement of patient's thyroid levels will either consider 37.5 µg oral daily  Or will alternate the doses.   Discussed with the patient that we would be aiming TSH levels around 2.5 slightly less to improve her chances of fertility.    Patient is considering IVF.    Thank you for asking me to see your patient, Cecille Devi in consultation.      The total face to face time spent  34 minutes with the patient,60 minutes (greater than 50% of the total time) was spent counseling and coordination of the care on hypothyroidism and its role in conceiving.    Aracely Wolff MD  03/29/18

## 2018-04-26 ENCOUNTER — RESULTS ENCOUNTER (OUTPATIENT)
Dept: INTERNAL MEDICINE | Facility: CLINIC | Age: 33
End: 2018-04-26

## 2018-04-26 DIAGNOSIS — E03.9 HYPOTHYROIDISM, UNSPECIFIED TYPE: ICD-10-CM

## 2018-04-26 DIAGNOSIS — R74.8 ELEVATED LIVER ENZYMES: ICD-10-CM

## 2018-04-28 LAB
25(OH)D3+25(OH)D2 SERPL-MCNC: 38.5 NG/ML (ref 30–100)
ALBUMIN SERPL-MCNC: 5 G/DL (ref 3.5–5.2)
ALBUMIN/GLOB SERPL: 2.3 G/DL
ALP SERPL-CCNC: 79 U/L (ref 39–117)
ALT SERPL-CCNC: 12 U/L (ref 1–33)
AST SERPL-CCNC: 18 U/L (ref 1–32)
BASOPHILS # BLD AUTO: 0.05 10*3/MM3 (ref 0–0.2)
BASOPHILS NFR BLD AUTO: 0.7 % (ref 0–1.5)
BILIRUB SERPL-MCNC: 0.5 MG/DL (ref 0.1–1.2)
BUN SERPL-MCNC: 13 MG/DL (ref 6–20)
BUN/CREAT SERPL: 14.6 (ref 7–25)
CALCIUM SERPL-MCNC: 9.8 MG/DL (ref 8.6–10.5)
CHLORIDE SERPL-SCNC: 103 MMOL/L (ref 98–107)
CHOLEST SERPL-MCNC: 191 MG/DL (ref 0–200)
CO2 SERPL-SCNC: 25.7 MMOL/L (ref 22–29)
CREAT SERPL-MCNC: 0.89 MG/DL (ref 0.57–1)
EOSINOPHIL # BLD AUTO: 0.2 10*3/MM3 (ref 0–0.7)
EOSINOPHIL NFR BLD AUTO: 2.8 % (ref 0.3–6.2)
ERYTHROCYTE [DISTWIDTH] IN BLOOD BY AUTOMATED COUNT: 14.8 % (ref 11.7–13)
GFR SERPLBLD CREATININE-BSD FMLA CKD-EPI: 74 ML/MIN/1.73
GFR SERPLBLD CREATININE-BSD FMLA CKD-EPI: 89 ML/MIN/1.73
GLOBULIN SER CALC-MCNC: 2.2 GM/DL
GLUCOSE SERPL-MCNC: 90 MG/DL (ref 65–99)
HCT VFR BLD AUTO: 42.2 % (ref 35.6–45.5)
HDLC SERPL-MCNC: 75 MG/DL (ref 40–60)
HGB BLD-MCNC: 13.5 G/DL (ref 11.9–15.5)
IMM GRANULOCYTES # BLD: 0.01 10*3/MM3 (ref 0–0.03)
IMM GRANULOCYTES NFR BLD: 0.1 % (ref 0–0.5)
LDLC SERPL CALC-MCNC: 105 MG/DL (ref 0–100)
LDLC/HDLC SERPL: 1.39 {RATIO}
LYMPHOCYTES # BLD AUTO: 2.14 10*3/MM3 (ref 0.9–4.8)
LYMPHOCYTES NFR BLD AUTO: 29.6 % (ref 19.6–45.3)
MCH RBC QN AUTO: 30 PG (ref 26.9–32)
MCHC RBC AUTO-ENTMCNC: 32 G/DL (ref 32.4–36.3)
MCV RBC AUTO: 93.8 FL (ref 80.5–98.2)
MONOCYTES # BLD AUTO: 0.69 10*3/MM3 (ref 0.2–1.2)
MONOCYTES NFR BLD AUTO: 9.5 % (ref 5–12)
NEUTROPHILS # BLD AUTO: 4.15 10*3/MM3 (ref 1.9–8.1)
NEUTROPHILS NFR BLD AUTO: 57.4 % (ref 42.7–76)
PLATELET # BLD AUTO: 278 10*3/MM3 (ref 140–500)
POTASSIUM SERPL-SCNC: 4.6 MMOL/L (ref 3.5–5.2)
PROT SERPL-MCNC: 7.2 G/DL (ref 6–8.5)
RBC # BLD AUTO: 4.5 10*6/MM3 (ref 3.9–5.2)
SODIUM SERPL-SCNC: 141 MMOL/L (ref 136–145)
T4 FREE SERPL-MCNC: 1.02 NG/DL (ref 0.93–1.7)
TRIGL SERPL-MCNC: 57 MG/DL (ref 0–150)
TSH SERPL DL<=0.005 MIU/L-ACNC: 4.31 MIU/ML (ref 0.27–4.2)
VLDLC SERPL CALC-MCNC: 11.4 MG/DL (ref 5–40)
WBC # BLD AUTO: 7.23 10*3/MM3 (ref 4.5–10.7)

## 2018-05-01 ENCOUNTER — OFFICE VISIT (OUTPATIENT)
Dept: INTERNAL MEDICINE | Facility: CLINIC | Age: 33
End: 2018-05-01

## 2018-05-01 VITALS
HEIGHT: 65 IN | SYSTOLIC BLOOD PRESSURE: 110 MMHG | WEIGHT: 148 LBS | OXYGEN SATURATION: 98 % | DIASTOLIC BLOOD PRESSURE: 64 MMHG | HEART RATE: 68 BPM | BODY MASS INDEX: 24.66 KG/M2 | TEMPERATURE: 98.3 F

## 2018-05-01 DIAGNOSIS — F41.9 ANXIETY: ICD-10-CM

## 2018-05-01 DIAGNOSIS — Z00.00 HEALTH CARE MAINTENANCE: Primary | ICD-10-CM

## 2018-05-01 DIAGNOSIS — E03.8 OTHER SPECIFIED HYPOTHYROIDISM: ICD-10-CM

## 2018-05-01 DIAGNOSIS — Z15.89 HETEROZYGOUS FOR MTHFR GENE MUTATION: ICD-10-CM

## 2018-05-01 PROBLEM — E03.9 HYPOTHYROIDISM: Status: RESOLVED | Noted: 2017-10-26 | Resolved: 2018-05-01

## 2018-05-01 PROCEDURE — 99395 PREV VISIT EST AGE 18-39: CPT | Performed by: NURSE PRACTITIONER

## 2018-05-01 RX ORDER — ALPRAZOLAM 0.25 MG/1
0.25 TABLET ORAL DAILY PRN
Qty: 6 TABLET | Refills: 0 | Status: SHIPPED | OUTPATIENT
Start: 2018-05-01 | End: 2019-07-29 | Stop reason: HOSPADM

## 2018-05-01 RX ORDER — LETROZOLE 2.5 MG/1
TABLET, FILM COATED ORAL
Refills: 2 | COMMUNITY
Start: 2018-04-27 | End: 2019-07-29 | Stop reason: HOSPADM

## 2018-05-01 RX ORDER — KETOCONAZOLE 20 MG/G
AEROSOL, FOAM TOPICAL
Refills: 11 | COMMUNITY
Start: 2018-04-27 | End: 2018-11-07

## 2018-05-01 NOTE — PROGRESS NOTES
Subjective   Cecille Devi is a 33 y.o. female here for CPE.    History of Present Illness   The patient is here today surgical for CPE and lab work follow-up.  She is exercising intermittently and eating a healthy diet.    Currently seeing endocrinology for thyroid management, Currently considering IVF.  She had been on 25 mcg of synthroid and TSH dropped too low. Currently off, endo following.   She is now taking Femara with her fertility doctor as well as a trigger shot after US.   They are watching her AMH, which looks at ovarian reserve. Taking COQ10 and DHEA.     Her best friend is pregnant with 2nd child. She is seeing a therapist and also using acupuncture.     MTHFR- taking folic acid and ASA    Flight anxiety- works well uses rarely.     Will occ abd pain/burning after acidic foods. Does still have GB.   The following portions of the patient's history were reviewed and updated as appropriate: allergies, current medications, past family history, past medical history, past social history, past surgical history and problem list.    Review of Systems   Constitutional: Negative.    HENT: Negative.    Eyes: Negative.    Respiratory: Negative.    Cardiovascular: Negative.    Gastrointestinal: Negative.    Endocrine: Negative.    Genitourinary: Negative.    Musculoskeletal: Negative.    Skin: Negative.    Allergic/Immunologic: Negative.    Neurological: Negative.    Hematological: Negative for adenopathy. Bruises/bleeds easily (easily bruises).   Psychiatric/Behavioral: Negative for dysphoric mood and suicidal ideas. The patient is nervous/anxious.        Objective   Physical Exam   Constitutional: She is oriented to person, place, and time. Vital signs are normal. She appears well-developed and well-nourished.   HENT:   Right Ear: Hearing, tympanic membrane, external ear and ear canal normal.   Left Ear: Hearing, tympanic membrane, external ear and ear canal normal.   Nose: Nose normal.   Mouth/Throat: Uvula is  midline, oropharynx is clear and moist and mucous membranes are normal.   Eyes: Conjunctivae, EOM and lids are normal. Pupils are equal, round, and reactive to light.   Neck: Normal range of motion. Neck supple. Normal carotid pulses present. Carotid bruit is not present. No thyromegaly present.   Cardiovascular: Normal rate, regular rhythm, normal heart sounds and intact distal pulses.    Pulmonary/Chest: Effort normal and breath sounds normal.   Per GYN   Abdominal: Soft. Normal appearance, normal aorta and bowel sounds are normal. There is no hepatosplenomegaly. There is tenderness (trace) in the epigastric area.   Genitourinary:   Genitourinary Comments: Per gyn   Musculoskeletal: Normal range of motion.   Lymphadenopathy:     She has no cervical adenopathy.        Right: No inguinal and no supraclavicular adenopathy present.        Left: No inguinal and no supraclavicular adenopathy present.   Neurological: She is alert and oriented to person, place, and time. She has normal strength. No cranial nerve deficit or sensory deficit.   Reflex Scores:       Patellar reflexes are 2+ on the right side and 2+ on the left side.  Skin: Skin is warm, dry and intact.   Psychiatric: She has a normal mood and affect. Her speech is normal and behavior is normal. Judgment and thought content normal. Cognition and memory are normal.       Assessment/Plan   There are no diagnoses linked to this encounter.    1. HCM- continue to eat a healthy diet work toward regular activity  2. Hypothyroidism- to see endo back end of may.   3. HH- check annual ferritin levels  4. Anxiety- refill xanax for flight this week, aware approp use and denies SEs.     Hep A- discussed  Dentist- UTD  Wearing sunscreen.   UTD derm

## 2018-05-10 ENCOUNTER — RESULTS ENCOUNTER (OUTPATIENT)
Dept: ENDOCRINOLOGY | Age: 33
End: 2018-05-10

## 2018-05-10 DIAGNOSIS — E03.8 OTHER SPECIFIED HYPOTHYROIDISM: ICD-10-CM

## 2018-05-22 LAB
T4 FREE SERPL-MCNC: 1.01 NG/DL (ref 0.93–1.7)
TSH SERPL DL<=0.005 MIU/L-ACNC: 5.14 MIU/ML (ref 0.27–4.2)

## 2018-05-29 ENCOUNTER — OFFICE VISIT (OUTPATIENT)
Dept: ENDOCRINOLOGY | Age: 33
End: 2018-05-29

## 2018-05-29 VITALS
WEIGHT: 148 LBS | DIASTOLIC BLOOD PRESSURE: 80 MMHG | BODY MASS INDEX: 24.66 KG/M2 | HEIGHT: 65 IN | HEART RATE: 58 BPM | SYSTOLIC BLOOD PRESSURE: 116 MMHG | OXYGEN SATURATION: 97 %

## 2018-05-29 DIAGNOSIS — E03.9 ACQUIRED HYPOTHYROIDISM: Primary | ICD-10-CM

## 2018-05-29 PROCEDURE — 99213 OFFICE O/P EST LOW 20 MIN: CPT | Performed by: INTERNAL MEDICINE

## 2018-05-29 RX ORDER — LEVOTHYROXINE SODIUM 0.03 MG/1
TABLET ORAL
Qty: 30 TABLET | Refills: 11
Start: 2018-05-29 | End: 2018-12-10

## 2018-05-29 RX ORDER — LEVOTHYROXINE SODIUM 0.05 MG/1
TABLET ORAL
Qty: 30 TABLET | Refills: 11 | Status: SHIPPED | OUTPATIENT
Start: 2018-05-29 | End: 2018-12-10

## 2018-05-29 NOTE — PROGRESS NOTES
33 y.o.    Patient Care Team:  SHARRI Griffiths as PCP - General (Family Medicine)  SHARRI Griffiths (Family Medicine)    Chief Complaint:    8 week  Fellow up / Thyroidism    Subjective     HPI    Cecille Devi 33 y.o. WF presents as a new patient for the evaluation of Hypothyroidism. Consulted by .   Pt was started on medication in June 2017.   Pt has been issues with fertility, so they were aiming for TSH to be below 2.5. She has been started on levothyroxine 25 mcg oral daily and then increased to 50 but when got off she went back to 25 mcg oral daily.     During last visit patient's thyroid medication was discontinued.    Today in the clinic patient reports that she is not on any levothyroxin.  She does report decent energy levels, weight has been relatively stable, normal bowel movements, no hair loss, no increased sweating, does complain some dry skin, sleep is disturbed at times. Normal temperature preference.  No tremors, racing of heart or eye symptoms.  No shortness of breath, difficulty in swallowing or change in voice.  No family history of thyroid disease.    Menarche at age 13, periods are regular, LMP-yesterday.  They have been planning to conceive for the last 1 year now.  She is being managed by repetitive endocrinologist in [place].  Patient is currently getting letrozole and progesterone shots to help her conceive  She has been told that her ovarian reserve is low.         Interval History      The following portions of the patient's history were reviewed and updated as appropriate: allergies, current medications, past family history, past medical history, past social history, past surgical history and problem list.    Past Medical History:   Diagnosis Date   • Abdominal pain    • Acute epigastric pain    • Contraception     managerd by Sky Gonzalez    • Miscarriage    • Nausea      Family History   Problem Relation Age of Onset   • Migraines Mother    • Hypertension Mother     • Stroke Maternal Grandmother    • No Known Problems Father    • No Known Problems Brother    • Stroke Maternal Grandfather    • No Known Problems Paternal Grandmother         fall   • No Known Problems Paternal Grandfather         old age   • Heart disease Paternal Uncle      Social History     Social History   • Marital status:      Spouse name: DANIEL   • Number of children: 0   • Years of education: 4     Occupational History   • 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     Social History Main Topics   • Smoking status: Never Smoker   • Smokeless tobacco: Never Used   • Alcohol use Yes      Comment: occasional drinker, SOCAILLY    • Drug use: No   • Sexual activity: Yes     Partners: Male     Birth control/ protection: None     Other Topics Concern   • Not on file     Social History Narrative    ** Merged History Encounter **              No Known Allergies    Current Outpatient Prescriptions:   •  ALPRAZolam (XANAX) 0.25 MG tablet, Take 1 tablet by mouth Daily As Needed for Anxiety., Disp: 6 tablet, Rfl: 0  •  aspirin 81 MG chewable tablet, Chew 81 mg Daily., Disp: , Rfl:   •  Coenzyme Q10 (CO Q-10) 400 MG capsule, Take  by mouth., Disp: , Rfl:   •  DHEA 25 MG capsule, Take  by mouth 3 (Three) Times a Day With Meals., Disp: , Rfl:   •  FOLIC ACID PO, Take  by mouth., Disp: , Rfl:   •  ibuprofen (ADVIL,MOTRIN) 600 MG tablet, Take 600 mg by mouth Every 6 (Six) Hours As Needed for Mild Pain (1-3)., Disp: , Rfl:   •  Prenatal Vit-Fe Fumarate-FA (PRENATAL, CLASSIC, VITAMIN) 28-0.8 MG tablet tablet, Take  by mouth Daily., Disp: , Rfl:   •  progesterone (PROMETRIUM) 200 MG capsule, TK 1 C PO QD BEGINNING CYCLE DAY 17 UTD, Disp: , Rfl: 3  •  EXTINA 2 % foam, APPLY ON THE SKIN BID, Disp: , Rfl: 11  •  letrozole (FEMARA) 2.5 MG tablet, TK 1 T PO D ON DAYS 3-7, Disp: , Rfl: 2  •  levothyroxine (SYNTHROID, LEVOTHROID) 25 MCG tablet, Take 1 tab for 5 days a week, Disp: 30 tablet, Rfl: 11  •  levothyroxine  "(SYNTHROID, LEVOTHROID) 50 MCG tablet, Take 1 tab for 2 days a week., Disp: 30 tablet, Rfl: 11  •  ondansetron ODT (ZOFRAN-ODT) 4 MG disintegrating tablet, Take 1 tablet by mouth 4 (Four) Times a Day As Needed for Nausea or Vomiting., Disp: 12 tablet, Rfl: 0        Review of Systems   Constitutional: Positive for appetite change. Negative for fever.   Eyes: Negative for visual disturbance.   Respiratory: Negative for shortness of breath.    Cardiovascular: Negative for palpitations and leg swelling.   Gastrointestinal: Negative for abdominal pain and vomiting.   Endocrine: Negative for polydipsia and polyuria.   Musculoskeletal: Negative for joint swelling and neck pain.   Skin: Negative for rash.   Neurological: Negative for weakness and numbness.   Psychiatric/Behavioral: Negative for behavioral problems.       Objective       Vitals:    05/29/18 1308   BP: 116/80   Pulse: 58   SpO2: 97%   Weight: 67.1 kg (148 lb)   Height: 165.1 cm (65\")     Body mass index is 24.63 kg/m².      Physical Exam   Constitutional: She is oriented to person, place, and time. She appears well-nourished.   HENT:   Head: Normocephalic and atraumatic.   Eyes: Conjunctivae and EOM are normal. No scleral icterus.   Neck: Normal range of motion. Neck supple. No thyromegaly present.   Cardiovascular: Normal rate and normal heart sounds.  Exam reveals no friction rub.    No murmur heard.  HR - 70   Pulmonary/Chest: Effort normal and breath sounds normal. No stridor. She has no wheezes. She has no rales.   Abdominal: Soft. Bowel sounds are normal. She exhibits no distension. There is no tenderness.   Musculoskeletal: She exhibits no edema or tenderness.   Lymphadenopathy:     She has no cervical adenopathy.   Neurological: She is alert and oriented to person, place, and time.   Skin: Skin is warm and dry. She is not diaphoretic.   Psychiatric: She has a normal mood and affect.   Vitals reviewed.    Results Review:     I reviewed the patient's " "new clinical results and mentioned them above in HPI and in plan as well.    Medical records reviewed  Summary: done      Results Encounter on 05/10/2018   Component Date Value Ref Range Status   • TSH 05/22/2018 5.140* 0.270 - 4.200 mIU/mL Final   • Free T4 05/22/2018 1.01  0.93 - 1.70 ng/dL Final     No results found for: HGBA1C  Lab Results   Component Value Date    CREATININE 0.89 04/27/2018     Imaging Results (most recent)     None                Assessment and Plan:    Cecille was seen today for hypothyroidism.    Diagnoses and all orders for this visit:    Acquired hypothyroidism    Other orders  -     levothyroxine (SYNTHROID, LEVOTHROID) 25 MCG tablet; Take 1 tab for 5 days a week  -     levothyroxine (SYNTHROID, LEVOTHROID) 50 MCG tablet; Take 1 tab for 2 days a week.      Hypothyroidism  Will start patient on levothyroxin 25 µg for 5 days a week and 50 µg for 2 days a week  Will repeat the thyroid function tests in the next 6-8 weeks.      Explained to the patient that when she gets pregnant she needs to let me know immediately to check her thyroid levels and further adjust her thyroid medication.    Counseled the patient the importance of thyroid replacement in pregnancy for the growth and development and IQ of the baby.    Reviewed Lab results with the patient.             Aracely Wolff MD  05/29/18    EMR Dragon / transcription disclaimer:     \"Dictated utilizing Dragon dictation\".  "

## 2018-06-27 DIAGNOSIS — E03.9 ACQUIRED HYPOTHYROIDISM: Primary | ICD-10-CM

## 2018-06-29 ENCOUNTER — TELEPHONE (OUTPATIENT)
Dept: ENDOCRINOLOGY | Age: 33
End: 2018-06-29

## 2018-06-29 DIAGNOSIS — E03.9 ACQUIRED HYPOTHYROIDISM: Primary | ICD-10-CM

## 2018-06-29 NOTE — TELEPHONE ENCOUNTER
SPOKE WITH PATIENT LET PATIENT KNOW THAT SHE NEEDED TO COME IF FOR LABS NEXT WEEK PT STATED SHE WILL BE HERE ON Monday 7/2    ----- Message from Abigail Hester sent at 6/29/2018  8:51 AM EDT -----  Contact: PATIENT   MESSAGE FROM PATIENT       MESSAGE/ISSUE:     PATIENT HAS CALLED IN REGARD TO JUST BECOMING PREGNANT.  DR MONTESINOS HAD TOLD THE PATIENT TO CALL HER RIGHT AWAY,  WHEN SHE KNEW SHE WAS PREGNANT.    PATIENT  IS REQUESTING A CALL BACK IN REGARDS TO THIS.        CALL BACK NUMBER:   [phone]

## 2018-07-02 ENCOUNTER — RESULTS ENCOUNTER (OUTPATIENT)
Dept: ENDOCRINOLOGY | Age: 33
End: 2018-07-02

## 2018-07-02 ENCOUNTER — LAB (OUTPATIENT)
Dept: ENDOCRINOLOGY | Age: 33
End: 2018-07-02

## 2018-07-02 DIAGNOSIS — E03.9 ACQUIRED HYPOTHYROIDISM: ICD-10-CM

## 2018-07-02 LAB
T4 FREE SERPL-MCNC: 1.36 NG/DL (ref 0.93–1.7)
TSH SERPL DL<=0.005 MIU/L-ACNC: 2.44 MIU/ML (ref 0.27–4.2)

## 2018-07-17 ENCOUNTER — TELEPHONE (OUTPATIENT)
Dept: ENDOCRINOLOGY | Age: 33
End: 2018-07-17

## 2018-07-17 NOTE — TELEPHONE ENCOUNTER
SPOKE WITH PATIENT LET PATIENT KNOW TO CONTINUE HER CURRENT DOSE OF  LEVOTHYROXINE  25 MCG FOR 5 DAYS A WEEK   LEVOTHYROXINE 50 MCG 2 DAY A WEEK  UNTIL SHE SEE THE OB TO CONFIRM THAT IS NOT PREGNANT ANYMORE   ONCE SHE CONFIRMS GO BACK TO THE OLD REGIME SHE WAS DOING      ----- Message from Billie Agosto sent at 7/17/2018  8:31 AM EDT -----  Contact: PATIENT  PATIENT CALLED AND STATED THAT SHE WAS PREGNANT BUT DOESN'T THINK SHE IS NOW. PATIENT GOES TO SEE HER DOCTOR THIS Friday TO DO MORE TESTING. PATIENT WANTED TO KNOW WHAT YOU WANTED HER TO DO - IF YOU WANT HER TO GO BACK TO HER OLD REGIMEN OR CONTINUE WHAT SHE IS CURRENTLY DOING. PATIENT WOULD LIKE A CALL BACK TO DISCUSS.    KAMI [phone]

## 2018-08-23 LAB
T4 FREE SERPL-MCNC: 1.21 NG/DL (ref 0.93–1.7)
TSH SERPL DL<=0.005 MIU/L-ACNC: 1.86 MIU/ML (ref 0.27–4.2)

## 2018-11-07 ENCOUNTER — OFFICE VISIT (OUTPATIENT)
Dept: ENDOCRINOLOGY | Age: 33
End: 2018-11-07

## 2018-11-07 VITALS
OXYGEN SATURATION: 100 % | DIASTOLIC BLOOD PRESSURE: 64 MMHG | SYSTOLIC BLOOD PRESSURE: 102 MMHG | HEIGHT: 65 IN | HEART RATE: 69 BPM | WEIGHT: 148 LBS | BODY MASS INDEX: 24.66 KG/M2

## 2018-11-07 DIAGNOSIS — N97.9 FEMALE FERTILITY PROBLEM: ICD-10-CM

## 2018-11-07 DIAGNOSIS — E03.9 ACQUIRED HYPOTHYROIDISM: Primary | ICD-10-CM

## 2018-11-07 PROCEDURE — 99214 OFFICE O/P EST MOD 30 MIN: CPT | Performed by: INTERNAL MEDICINE

## 2018-11-07 NOTE — PROGRESS NOTES
33 y.o.    Patient Care Team:  Gita Lorenzo APRN as PCP - General (Family Medicine)  Gita Lorenzo APRN (Family Medicine)    Chief Complaint:    3 MONTH FOLLOW UP/HYPOTHYROIDISM  Subjective     HPI    Cecille Devi 33 y.o. WF presents as a new patient for the evaluation of Hypothyroidism. Consulted by .   Pt was started on medication in June 2017.   Pt has been issues with fertility, so they were aiming for TSH to be below 2.5.  Over the period of time they have been on and off of her levothyroxin dosage as her TSH levels have been abnormal throughout this time.  During her last visit with her is her thyroid levels have been adjusted to the current dosage of levothyroxin and they have been normal.    Today in the clinic patient reports that she is on levothyroxine 25 µg for 5 days a week and 50 µg for 2 days a week.  She reports decent energy levels, weight has been relatively stable, normal bowel movements, under stress due to the fertility issues, no increased sweating, occasional hot flashes when she is on the trigger injections.  Sleep is disturbed at times.  Normal temperature preference.  No tremors, racing of heart or eye symptoms.  No family history of thyroid disease  No shortness of breath, difficulty in swallowing or change in voice.    Menarche at age 13, periods are regular, LMP- 30 days ago.   She is being managed by reproductive endocrinologist in [place].  Patient is currently on femera and trigger shots and also on progesterone.   She had ectopic pregnancy in July 2018, had to go through laparoscopic surgery, both the tubes are intact.   She might consider IVF next year.   She has been told that her ovarian reserve is low.     Reviewed primary care physician's/consulting physician documentation and lab results :     Interval History      The following portions of the patient's history were reviewed and updated as appropriate: allergies, current medications, past family history,  past medical history, past social history, past surgical history and problem list.    Past Medical History:   Diagnosis Date   • Abdominal pain    • Acute epigastric pain    • Contraception     managerd by Sky Gonzalez    • Miscarriage    • Nausea      Family History   Problem Relation Age of Onset   • Migraines Mother    • Hypertension Mother    • Stroke Maternal Grandmother    • No Known Problems Father    • No Known Problems Brother    • Stroke Maternal Grandfather    • No Known Problems Paternal Grandmother         fall   • No Known Problems Paternal Grandfather         old age   • Heart disease Paternal Uncle      Social History     Social History   • Marital status:      Spouse name: DANIEL   • Number of children: 0   • Years of education: 4     Occupational History   • [employer]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     Social History Main Topics   • Smoking status: Never Smoker   • Smokeless tobacco: Never Used   • Alcohol use Yes      Comment: occasional drinker, SOCAILLY    • Drug use: No   • Sexual activity: Yes     Partners: Male     Birth control/ protection: None     Other Topics Concern   • Not on file     Social History Narrative    ** Merged History Encounter **              No Known Allergies    Current Outpatient Prescriptions:   •  ALPRAZolam (XANAX) 0.25 MG tablet, Take 1 tablet by mouth Daily As Needed for Anxiety., Disp: 6 tablet, Rfl: 0  •  aspirin 81 MG chewable tablet, Chew 81 mg Daily., Disp: , Rfl:   •  Coenzyme Q10 (CO Q-10) 400 MG capsule, Take  by mouth., Disp: , Rfl:   •  DHEA 25 MG capsule, Take  by mouth 3 (Three) Times a Day With Meals., Disp: , Rfl:   •  FOLIC ACID PO, Take  by mouth., Disp: , Rfl:   •  ibuprofen (ADVIL,MOTRIN) 600 MG tablet, Take 600 mg by mouth Every 6 (Six) Hours As Needed for Mild Pain (1-3)., Disp: , Rfl:   •  letrozole (FEMARA) 2.5 MG tablet, TK 1 T PO D ON DAYS 3-7, Disp: , Rfl: 2  •  levothyroxine (SYNTHROID, LEVOTHROID) 25 MCG tablet, Take 1 tab  "for 5 days a week, Disp: 30 tablet, Rfl: 11  •  levothyroxine (SYNTHROID, LEVOTHROID) 50 MCG tablet, Take 1 tab for 2 days a week., Disp: 30 tablet, Rfl: 11  •  Prenatal Vit-Fe Fumarate-FA (PRENATAL, CLASSIC, VITAMIN) 28-0.8 MG tablet tablet, Take  by mouth Daily., Disp: , Rfl:   •  progesterone (PROMETRIUM) 200 MG capsule, TK 1 C PO QD BEGINNING CYCLE DAY 17 UTD, Disp: , Rfl: 3  •  ondansetron ODT (ZOFRAN-ODT) 4 MG disintegrating tablet, Take 1 tablet by mouth 4 (Four) Times a Day As Needed for Nausea or Vomiting., Disp: 12 tablet, Rfl: 0        Review of Systems    Objective       Vitals:    11/07/18 1157   BP: 102/64   Pulse: 69   SpO2: 100%   Weight: 67.1 kg (148 lb)   Height: 165.1 cm (65\")     Body mass index is 24.63 kg/m².      Physical Exam   Constitutional: She is oriented to person, place, and time. She appears well-nourished.   HENT:   Head: Normocephalic and atraumatic.   Eyes: Conjunctivae and EOM are normal. No scleral icterus.   Neck: Normal range of motion. Neck supple. No thyromegaly present.   Cardiovascular: Normal rate and normal heart sounds.  Exam reveals no friction rub.    No murmur heard.  Pulmonary/Chest: Effort normal and breath sounds normal. No stridor. She has no wheezes. She has no rales.   Abdominal: Soft. Bowel sounds are normal. She exhibits no distension. There is no tenderness.   Musculoskeletal: She exhibits no edema or tenderness.   Lymphadenopathy:     She has no cervical adenopathy.   Neurological: She is alert and oriented to person, place, and time.   Skin: Skin is warm and dry. She is not diaphoretic.   Psychiatric: She has a normal mood and affect.   Vitals reviewed.    Results Review:     I reviewed the patient's new clinical results and mentioned them above in HPI and in plan as well.    Medical records reviewed  Summary: done      Results Encounter on 07/02/2018   Component Date Value Ref Range Status   • Free T4 08/23/2018 1.21  0.93 - 1.70 ng/dL Final   • TSH " "08/23/2018 1.860  0.270 - 4.200 mIU/mL Final     No results found for: HGBA1C  Lab Results   Component Value Date    CREATININE 0.89 04/27/2018     Imaging Results (most recent)     None                Assessment and Plan:    Cecille was seen today for hypothyroidism.    Diagnoses and all orders for this visit:    Acquired hypothyroidism  -     TSH  -     T4, Free  -     Vitamin D 25 Hydroxy  -     TSH; Future  -     T4, Free; Future  -     Vitamin D 25 Hydroxy; Future  -     Basic Metabolic Panel; Future  -     hCG, Quantitative, Pregnancy    Female fertility problem      Hypothyroidism  Patient is not sure if she is pregnant today or not  Will check thyroid panel  Will also check her hCG levels quantitative.  Explained to the patient that she needs to alert the reproductive endocrine immediately when we disclose these results to patient.    Might have to consider adjusting her thyroid medication based on the trimester of her pregnancy.    Will check vitamin D levels    Discussed with the patient that she needs to alert us immediately when she gets to know that she is pregnant as thyroid levels are extremely questionable during the first 2 trimester of pregnancy.  Especially for the growth, development and IQ of the baby.    Reviewed Lab results with the patient.             Aracely Wolff MD  11/07/18    EMR Dragon / transcription disclaimer:     \"Dictated utilizing Dragon dictation\".  "

## 2018-11-08 LAB
25(OH)D3+25(OH)D2 SERPL-MCNC: 47.1 NG/ML (ref 30–100)
T4 FREE SERPL-MCNC: 1.27 NG/DL (ref 0.93–1.7)
TSH SERPL DL<=0.005 MIU/L-ACNC: 1.94 MIU/ML (ref 0.27–4.2)

## 2018-12-07 ENCOUNTER — TELEPHONE (OUTPATIENT)
Dept: ENDOCRINOLOGY | Age: 33
End: 2018-12-07

## 2018-12-07 DIAGNOSIS — E89.0 POSTABLATIVE HYPOTHYROIDISM: Primary | ICD-10-CM

## 2018-12-07 NOTE — TELEPHONE ENCOUNTER
Called and spoke with patient pre Dr Wolff will do labs base on the lab results will adjust medication  Patient voice understanding will do labs at [facility]             ----- Message from Marina Mejia sent at 12/7/2018  2:20 PM EST -----  Contact: [phone]  Pt was told to call to let her know that she is pregnant    Please advise pt what to do next with her medications

## 2018-12-08 ENCOUNTER — LAB (OUTPATIENT)
Dept: LAB | Facility: HOSPITAL | Age: 33
End: 2018-12-08
Attending: INTERNAL MEDICINE

## 2018-12-08 DIAGNOSIS — E89.0 POSTABLATIVE HYPOTHYROIDISM: ICD-10-CM

## 2018-12-08 LAB — HCG INTACT+B SERPL-ACNC: 521.5 MIU/ML

## 2018-12-08 PROCEDURE — 84702 CHORIONIC GONADOTROPIN TEST: CPT | Performed by: INTERNAL MEDICINE

## 2018-12-08 PROCEDURE — 84439 ASSAY OF FREE THYROXINE: CPT

## 2018-12-08 PROCEDURE — 84443 ASSAY THYROID STIM HORMONE: CPT

## 2018-12-08 PROCEDURE — 36415 COLL VENOUS BLD VENIPUNCTURE: CPT | Performed by: INTERNAL MEDICINE

## 2018-12-10 RX ORDER — LEVOTHYROXINE SODIUM 0.07 MG/1
TABLET ORAL
Qty: 30 TABLET | Refills: 11 | Status: SHIPPED | OUTPATIENT
Start: 2018-12-10 | End: 2019-02-25 | Stop reason: SDUPTHER

## 2018-12-10 RX ORDER — LEVOTHYROXINE SODIUM 0.05 MG/1
TABLET ORAL
Qty: 30 TABLET | Refills: 11 | Status: SHIPPED | OUTPATIENT
Start: 2018-12-10 | End: 2019-09-17

## 2018-12-11 LAB
T4 FREE SERPL-MCNC: 1.3 NG/DL (ref 0.93–1.7)
TSH SERPL DL<=0.05 MIU/L-ACNC: 2.94 MIU/ML (ref 0.27–4.2)

## 2019-01-22 ENCOUNTER — LAB (OUTPATIENT)
Dept: ENDOCRINOLOGY | Age: 34
End: 2019-01-22

## 2019-01-22 DIAGNOSIS — E03.9 ACQUIRED HYPOTHYROIDISM: ICD-10-CM

## 2019-01-22 LAB
25(OH)D3+25(OH)D2 SERPL-MCNC: 40.8 NG/ML (ref 30–100)
BUN SERPL-MCNC: 9 MG/DL (ref 6–20)
BUN/CREAT SERPL: 14.5 (ref 7–25)
CALCIUM SERPL-MCNC: 9.9 MG/DL (ref 8.6–10.5)
CHLORIDE SERPL-SCNC: 102 MMOL/L (ref 98–107)
CO2 SERPL-SCNC: 20.7 MMOL/L (ref 22–29)
CREAT SERPL-MCNC: 0.62 MG/DL (ref 0.57–1)
GLUCOSE SERPL-MCNC: 75 MG/DL (ref 65–99)
POTASSIUM SERPL-SCNC: 4.5 MMOL/L (ref 3.5–5.2)
SODIUM SERPL-SCNC: 138 MMOL/L (ref 136–145)
T4 FREE SERPL-MCNC: 1.55 NG/DL (ref 0.93–1.7)
TSH SERPL DL<=0.005 MIU/L-ACNC: 0.42 MIU/ML (ref 0.27–4.2)

## 2019-02-03 ENCOUNTER — APPOINTMENT (OUTPATIENT)
Dept: ULTRASOUND IMAGING | Facility: HOSPITAL | Age: 34
End: 2019-02-03

## 2019-02-03 ENCOUNTER — HOSPITAL ENCOUNTER (EMERGENCY)
Facility: HOSPITAL | Age: 34
Discharge: HOME | End: 2019-02-03
Attending: EMERGENCY MEDICINE | Admitting: EMERGENCY MEDICINE

## 2019-02-03 VITALS
OXYGEN SATURATION: 98 % | SYSTOLIC BLOOD PRESSURE: 112 MMHG | HEART RATE: 85 BPM | DIASTOLIC BLOOD PRESSURE: 58 MMHG | WEIGHT: 142 LBS | HEIGHT: 65 IN | TEMPERATURE: 98.6 F | RESPIRATION RATE: 15 BRPM | BODY MASS INDEX: 23.66 KG/M2

## 2019-02-03 DIAGNOSIS — R10.9 LEFT FLANK PAIN: Primary | ICD-10-CM

## 2019-02-03 DIAGNOSIS — Z34.90 INTRAUTERINE PREGNANCY: ICD-10-CM

## 2019-02-03 LAB
ALBUMIN SERPL-MCNC: 3.9 G/DL (ref 3.5–5.2)
ALBUMIN/GLOB SERPL: 1.2 G/DL
ALP SERPL-CCNC: 78 U/L (ref 39–117)
ALT SERPL W P-5'-P-CCNC: 22 U/L (ref 1–33)
ANION GAP SERPL CALCULATED.3IONS-SCNC: 14.6 MMOL/L
AST SERPL-CCNC: 23 U/L (ref 1–32)
BASOPHILS # BLD AUTO: 0.03 10*3/MM3 (ref 0–0.2)
BASOPHILS NFR BLD AUTO: 0.3 % (ref 0–1.5)
BILIRUB SERPL-MCNC: 0.3 MG/DL (ref 0.1–1.2)
BILIRUB UR QL STRIP: NEGATIVE
BUN BLD-MCNC: 6 MG/DL (ref 6–20)
BUN/CREAT SERPL: 9.4 (ref 7–25)
CALCIUM SPEC-SCNC: 9.4 MG/DL (ref 8.6–10.5)
CHLORIDE SERPL-SCNC: 102 MMOL/L (ref 98–107)
CLARITY UR: ABNORMAL
CO2 SERPL-SCNC: 20.4 MMOL/L (ref 22–29)
COLOR UR: YELLOW
CREAT BLD-MCNC: 0.64 MG/DL (ref 0.57–1)
DEPRECATED RDW RBC AUTO: 47.5 FL (ref 37–54)
EOSINOPHIL # BLD AUTO: 0.04 10*3/MM3 (ref 0–0.7)
EOSINOPHIL NFR BLD AUTO: 0.4 % (ref 0.3–6.2)
ERYTHROCYTE [DISTWIDTH] IN BLOOD BY AUTOMATED COUNT: 14.1 % (ref 11.7–13)
GFR SERPL CREATININE-BSD FRML MDRD: 108 ML/MIN/1.73
GLOBULIN UR ELPH-MCNC: 3.2 GM/DL
GLUCOSE BLD-MCNC: 86 MG/DL (ref 65–99)
GLUCOSE UR STRIP-MCNC: NEGATIVE MG/DL
HCG INTACT+B SERPL-ACNC: NORMAL MIU/ML
HCT VFR BLD AUTO: 34.3 % (ref 35.6–45.5)
HGB BLD-MCNC: 11.2 G/DL (ref 11.9–15.5)
HGB UR QL STRIP.AUTO: NEGATIVE
IMM GRANULOCYTES # BLD AUTO: 0.04 10*3/MM3 (ref 0–0.03)
IMM GRANULOCYTES NFR BLD AUTO: 0.4 % (ref 0–0.5)
KETONES UR QL STRIP: ABNORMAL
LEUKOCYTE ESTERASE UR QL STRIP.AUTO: NEGATIVE
LYMPHOCYTES # BLD AUTO: 1.62 10*3/MM3 (ref 0.9–4.8)
LYMPHOCYTES NFR BLD AUTO: 14.8 % (ref 19.6–45.3)
MCH RBC QN AUTO: 30.2 PG (ref 26.9–32)
MCHC RBC AUTO-ENTMCNC: 32.7 G/DL (ref 32.4–36.3)
MCV RBC AUTO: 92.5 FL (ref 80.5–98.2)
MONOCYTES # BLD AUTO: 0.53 10*3/MM3 (ref 0.2–1.2)
MONOCYTES NFR BLD AUTO: 4.8 % (ref 5–12)
NEUTROPHILS # BLD AUTO: 8.7 10*3/MM3 (ref 1.9–8.1)
NEUTROPHILS NFR BLD AUTO: 79.3 % (ref 42.7–76)
NITRITE UR QL STRIP: NEGATIVE
PH UR STRIP.AUTO: 6 [PH] (ref 5–8)
PLATELET # BLD AUTO: 264 10*3/MM3 (ref 140–500)
PMV BLD AUTO: 10.5 FL (ref 6–12)
POTASSIUM BLD-SCNC: 3.9 MMOL/L (ref 3.5–5.2)
PROT SERPL-MCNC: 7.1 G/DL (ref 6–8.5)
PROT UR QL STRIP: NEGATIVE
RBC # BLD AUTO: 3.71 10*6/MM3 (ref 3.9–5.2)
SODIUM BLD-SCNC: 137 MMOL/L (ref 136–145)
SP GR UR STRIP: 1.01 (ref 1–1.03)
UROBILINOGEN UR QL STRIP: ABNORMAL
WBC NRBC COR # BLD: 10.96 10*3/MM3 (ref 4.5–10.7)

## 2019-02-03 PROCEDURE — 99283 EMERGENCY DEPT VISIT LOW MDM: CPT

## 2019-02-03 PROCEDURE — 84702 CHORIONIC GONADOTROPIN TEST: CPT | Performed by: EMERGENCY MEDICINE

## 2019-02-03 PROCEDURE — 76775 US EXAM ABDO BACK WALL LIM: CPT

## 2019-02-03 PROCEDURE — 81003 URINALYSIS AUTO W/O SCOPE: CPT | Performed by: EMERGENCY MEDICINE

## 2019-02-03 PROCEDURE — 85025 COMPLETE CBC W/AUTO DIFF WBC: CPT | Performed by: EMERGENCY MEDICINE

## 2019-02-03 PROCEDURE — 76815 OB US LIMITED FETUS(S): CPT

## 2019-02-03 PROCEDURE — 93976 VASCULAR STUDY: CPT

## 2019-02-03 PROCEDURE — 80053 COMPREHEN METABOLIC PANEL: CPT | Performed by: EMERGENCY MEDICINE

## 2019-02-03 RX ORDER — SODIUM CHLORIDE 0.9 % (FLUSH) 0.9 %
10 SYRINGE (ML) INJECTION AS NEEDED
Status: DISCONTINUED | OUTPATIENT
Start: 2019-02-03 | End: 2019-02-04 | Stop reason: HOSPADM

## 2019-02-04 NOTE — ED PROVIDER NOTES
" EMERGENCY DEPARTMENT ENCOUNTER    CHIEF COMPLAINT  Chief Complaint: left sided lower back pain  History given by: patient  History limited by: none  Room Number: 03/03  PMD: Gita Lorenzo, SHARRI      HPI:  Pt is a 33 y.o. female who presents complaining of left sided lower back pain that began last night. Pt states pain has improved over the past hour. Pt states pain is worse with movement especially going upstairs. Pt also complains of a head cold. Pt states she is 13 weeks pregnant with twins. Pt denies vaginal bleeding, dysuria, and difficulty urinating. Pt also complains of lower abd cramping but states \"I suspect it with them growing.\" Pt denies hx of kidney stone. Pt states she has an appointment with  (OB/GYN) in 3 days.     Duration:  One day  Onset: gradual  Timing: constant  Location: left lower back  Quality: pain  Intensity/Severity: moderate  Progression: slightly improved  Aggravating Factors: movement      PAST MEDICAL HISTORY  Active Ambulatory Problems     Diagnosis Date Noted   • Abdominal pain 03/11/2016   • Epigastric pain 03/11/2016   • Nausea 03/11/2016   • Anxiety 04/24/2017   • Elevated liver enzymes 04/24/2017   • Interstitial cystitis 10/26/2017   • Hypothyroidism 10/26/2017   • Heterozygous for MTHFR gene mutation (CMS/HCC) 11/08/2017   • Hereditary hemochromatosis (CMS/HCC) 11/08/2017     Resolved Ambulatory Problems     Diagnosis Date Noted   • No Resolved Ambulatory Problems     Past Medical History:   Diagnosis Date   • Abdominal pain    • Acute epigastric pain    • Contraception    • Miscarriage    • Nausea        PAST SURGICAL HISTORY  Past Surgical History:   Procedure Laterality Date   • ADENOIDECTOMY     • D&C WITH SUCTION N/A 2/24/2017    Procedure: DILATATION AND CURETTAGE WITH SUCTION--6.1 weeks gestation;  Surgeon: Maureen York MD;  Location: [facility];  Service:    • KNEE ARTHROSCOPY     • TONSILLECTOMY         FAMILY HISTORY  Family History   Problem " Relation Age of Onset   • Migraines Mother    • Hypertension Mother    • Stroke Maternal Grandmother    • No Known Problems Father    • No Known Problems Brother    • Stroke Maternal Grandfather    • No Known Problems Paternal Grandmother         fall   • No Known Problems Paternal Grandfather         old age   • Heart disease Paternal Uncle        SOCIAL HISTORY  Social History     Socioeconomic History   • Marital status:      Spouse name: DANIEL   • Number of children: 0   • Years of education: 4   • Highest education level: Not on file   Social Needs   • Financial resource strain: Not on file   • Food insecurity - worry: Not on file   • Food insecurity - inability: Not on file   • Transportation needs - medical: Not on file   • Transportation needs - non-medical: Not on file   Occupational History   • 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    REVIEW OF SYSTEMS  Review of Systems   Constitutional: Negative for fever.   HENT: Negative for sore throat.    Eyes: Negative.    Respiratory: Negative for cough and shortness of breath.    Cardiovascular: Negative for chest pain.   Gastrointestinal: Negative for abdominal pain, diarrhea and vomiting.   Genitourinary: Negative for difficulty urinating, dysuria and vaginal bleeding.   Musculoskeletal: Positive for back pain (left lower). Negative for neck pain.   Skin: Negative for rash.   Neurological: Negative for weakness, numbness and headaches.   Hematological: Negative.    Psychiatric/Behavioral:  Negative.    All other systems reviewed and are negative.      PHYSICAL EXAM  ED Triage Vitals   Temp Heart Rate Resp BP SpO2   02/03/19 1901 02/03/19 1901 02/03/19 1901 02/03/19 1915 02/03/19 1901   98.6 °F (37 °C) 96 18 126/64 98 %      Temp src Heart Rate Source Patient Position BP Location FiO2 (%)   02/03/19 1901 02/03/19 1917 -- -- --   Tympanic Monitor          Physical Exam   Constitutional: She is oriented to person, place, and time. No distress.   HENT:   Head: Normocephalic and atraumatic.   Eyes: EOM are normal. Pupils are equal, round, and reactive to light.   Neck: Normal range of motion. Neck supple.   Cardiovascular: Normal rate, regular rhythm and normal heart sounds.   Pulmonary/Chest: Effort normal and breath sounds normal. No respiratory distress.   Abdominal: Soft. There is tenderness (mild suprapubic). There is no rebound and no guarding.   Musculoskeletal: Normal range of motion. She exhibits no edema.   Neurological: She is alert and oriented to person, place, and time. She has normal sensation and normal strength.   Skin: Skin is warm and dry. No rash noted.   Psychiatric: Mood and affect normal.   Nursing note and vitals reviewed.      LAB RESULTS  Lab Results (last 24 hours)     Procedure Component Value Units Date/Time    CBC & Differential [[id number]] Collected:  02/03/19 1950    Specimen:  Blood Updated:  02/03/19 2006    Narrative:       The following orders were created for panel order CBC & Differential.  Procedure                               Abnormality         Status                     ---------                               -----------         ------                     CBC Auto Differential[[id number]]        Abnormal            Final result                 Please view results for these tests on the individual orders.    Comprehensive Metabolic Panel [[id number]]  (Abnormal) Collected:  02/03/19 1950    Specimen:  Blood Updated:  02/03/19 2020     Glucose 86 mg/dL      BUN 6  mg/dL      Creatinine 0.64 mg/dL      Sodium 137 mmol/L      Potassium 3.9 mmol/L      Chloride 102 mmol/L      CO2 20.4 mmol/L      Calcium 9.4 mg/dL      Total Protein 7.1 g/dL      Albumin 3.90 g/dL      ALT (SGPT) 22 U/L      AST (SGOT) 23 U/L      Alkaline Phosphatase 78 U/L      Total Bilirubin 0.3 mg/dL      eGFR Non African Amer 108 mL/min/1.73      Globulin 3.2 gm/dL      A/G Ratio 1.2 g/dL      BUN/Creatinine Ratio 9.4     Anion Gap 14.6 mmol/L     hCG, Quantitative, Pregnancy [[id number]] Collected:  02/03/19 1950    Specimen:  Blood Updated:  02/03/19 2036     HCG Quantitative 218,500.00 mIU/mL     Narrative:       HCG Ranges by Gestational Age    3 Weeks         5.4 -      72 mIU/mL  4 Weeks        10.2 -     708 mIU/mL  5 Weeks       217   -   8,245 mIU/mL  6 Weeks       152   -  32,177 mIU/mL  7 Weeks     4,059   - 153,767 mIU/mL  8 Weeks    31,366   - 149,094 mIU/mL  9 Weeks    59,109   - 135,901 mIU/mL  10 Weeks   44,186   - 170,409 mIU/mL  12 Weeks   27,107   - 201,615 mIU/mL  14 Weeks   24,302   -  93,646 mIU/mL  15 Weeks   12,540   -  69,747 mIU/mL  16 Weeks    8,904   -  55,332 mIU/mL  17 Weeks    8,240   -  51,793 mIU/mL  18 Weeks    9,649   -  55,271 mIU/mL    CBC Auto Differential [[id number]]  (Abnormal) Collected:  02/03/19 1950    Specimen:  Blood Updated:  02/03/19 2006     WBC 10.96 10*3/mm3      RBC 3.71 10*6/mm3      Hemoglobin 11.2 g/dL      Hematocrit 34.3 %      MCV 92.5 fL      MCH 30.2 pg      MCHC 32.7 g/dL      RDW 14.1 %      RDW-SD 47.5 fl      MPV 10.5 fL      Platelets 264 10*3/mm3      Neutrophil % 79.3 %      Lymphocyte % 14.8 %      Monocyte % 4.8 %      Eosinophil % 0.4 %      Basophil % 0.3 %      Immature Grans % 0.4 %      Neutrophils, Absolute 8.70 10*3/mm3      Lymphocytes, Absolute 1.62 10*3/mm3      Monocytes, Absolute 0.53 10*3/mm3      Eosinophils, Absolute 0.04 10*3/mm3      Basophils, Absolute 0.03 10*3/mm3      Immature Grans, Absolute 0.04 10*3/mm3      Urinalysis With Microscopic If Indicated (No Culture) - Urine, Clean Catch [[id number]]  (Abnormal) Collected:  02/03/19 2005    Specimen:  Urine, Clean Catch Updated:  02/03/19 2012     Color, UA Yellow     Appearance, UA Cloudy     pH, UA 6.0     Specific Gravity, UA 1.012     Glucose, UA Negative     Ketones, UA 15 mg/dL (1+)     Bilirubin, UA Negative     Blood, UA Negative     Protein, UA Negative     Leuk Esterase, UA Negative     Nitrite, UA Negative     Urobilinogen, UA 0.2 E.U./dL    Narrative:       Urine microscopic not indicated.          I ordered the above labs and reviewed the results    RADIOLOGY  US Ob Limited 1 + Fetuses   Final Result   Unremarkable twin pregnancy with gestational age of 13   weeks, 0 days       This report was finalized on 2/3/2019 9:08 PM by Liu Briggs M.D.          US Renal Bilateral   Final Result   1.  13 mm minimally complex upper pole left renal cyst.       This report was finalized on 2/3/2019 9:05 PM by Liu Briggs M.D.          US Testicular or Ovarian Vascular Limited        I ordered the above noted radiological studies. Interpreted by radiologist.  Reviewed by me in PACS.       PROGRESS AND CONSULTS  ED Course as of Feb 03 2222   Sun Feb 03, 2019 2218 10:18 PM  Patient with left flank pain of unclear etiology.  Urine looks normal.  She is 13 weeks pregnant.  Ultrasound shows no hydronephrosis and normal IUPs.  Discussed with Dr. Dejesus. Will discharge home to follow up with Dr. York.  [SL]      ED Course User Index  [SL] Vicente Talamantes MD     1933-Ordered lab work, US renal and OB US panel <14 weeks for further evaluation.     2048-Per US tech, both fetus are stable and measuring adequately with HR-130's. She states there is no hydronephrosis or evidence of a renal stone.     2117-Rechecked pt. Pt is resting comfortably. Notified pt of unremarkable lab work and that US panel was negative acute. Informed pt that fetuses are measuring adequately. Discussed  the plan to consult with OB/GYN prior to discharge. Pt understands and agrees with the plan, all questions answered.    2118-Placed call to OB/GYN for consult.    2215-Discussed pt case with  (OB/GYN) who agrees with the plan of care and states to keep f/u as scheduled.     2216-Rechecked pt. Pt is resting comfortably. Notified pt of my consult with  (OB/GYN). Discussed the plan to discharge the pt home. I instructed the pt to f/u with OB/GYN as scheduled and gave pt RTER warning including worsening pain and fever. Pt understands and agrees with the plan, all questions answered.      MEDICAL DECISION MAKING  Results were reviewed/discussed with the patient and they were also made aware of online access. Pt also made aware that some labs, such as cultures, will not be resulted during ER visit and follow up with PMD is necessary.     MDM  Number of Diagnoses or Management Options     Amount and/or Complexity of Data Reviewed  Clinical lab tests: reviewed and ordered (hCG-686361.00  WBC-10.96  UA-negative  )  Tests in the radiology section of CPT®: reviewed and ordered (US OB-Unremarkable twin pregnancy with gestational age of 13 weeks exact  US renal bilaterally-13 mm minimally complex upper pole left renal cyst)  Decide to obtain previous medical records or to obtain history from someone other than the patient: yes  Discuss the patient with other providers: yes ( (OB/GYN))  Independent visualization of images, tracings, or specimens: yes           DIAGNOSIS  Final diagnoses:   Left flank pain   Intrauterine pregnancy       DISPOSITION  DISCHARGE    Patient discharged in stable condition.    Reviewed implications of results, diagnosis, meds, responsibility to follow up, warning signs and symptoms of possible worsening, potential complications and reasons to return to ER.    Patient/Family voiced understanding of above instructions.    Discussed plan for discharge, as there is no emergent  indication for admission. Patient referred to primary care provider for BP management due to today's BP. Pt/family is agreeable and understands need for follow up and repeat testing.  Pt is aware that discharge does not mean that nothing is wrong but it indicates no emergency is present that requires admission and they must continue care with follow-up as given below or physician of their choice.     FOLLOW-UP  Maureen York MD  [address] [facility] [id number] [phone]    Schedule an appointment as soon as possible for a visit            Medication List      No changes were made to your prescriptions during this visit.           Latest Documented Vital Signs:  As of 10:22 PM  BP- 126/64 HR- 90 Temp- 98.6 °F (37 °C) (Tympanic) O2 sat- 98%    --  Documentation assistance provided by diana Richmond for .  Information recorded by the scribe was done at my direction and has been verified and validated by me.          Maryan Richmond  02/03/19 6067       Vicente Talamantes MD  02/03/19 3465

## 2019-02-04 NOTE — ED NOTES
Pt reports last night around 9 pm developed sudden onset of sharp lower back pain on the left side. Denies injury to lower back and reports the pain has  Subsided a little today. Pt also reports pelvic cramping.     She reports the pain feels like gas pain. Pt is 12 weeks pregnant with twins. Denies vaginal bleeding. Denies n/v.      Debi Miranda, RN  02/03/19 1916

## 2019-02-25 RX ORDER — LEVOTHYROXINE SODIUM 0.07 MG/1
TABLET ORAL
Qty: 30 TABLET | Refills: 1 | Status: SHIPPED | OUTPATIENT
Start: 2019-02-25 | End: 2019-09-17

## 2019-03-19 DIAGNOSIS — E03.9 ACQUIRED HYPOTHYROIDISM: Primary | ICD-10-CM

## 2019-03-28 ENCOUNTER — RESULTS ENCOUNTER (OUTPATIENT)
Dept: ENDOCRINOLOGY | Age: 34
End: 2019-03-28

## 2019-03-28 DIAGNOSIS — E03.9 ACQUIRED HYPOTHYROIDISM: ICD-10-CM

## 2019-03-29 LAB
25(OH)D3+25(OH)D2 SERPL-MCNC: 35.4 NG/ML (ref 30–100)
ALBUMIN SERPL-MCNC: 3.9 G/DL (ref 3.5–5.5)
ALBUMIN/GLOB SERPL: 1.6 {RATIO} (ref 1.2–2.2)
ALP SERPL-CCNC: 79 IU/L (ref 39–117)
ALT SERPL-CCNC: 17 IU/L (ref 0–32)
AST SERPL-CCNC: 17 IU/L (ref 0–40)
BILIRUB SERPL-MCNC: <0.2 MG/DL (ref 0–1.2)
BUN SERPL-MCNC: 10 MG/DL (ref 6–20)
BUN/CREAT SERPL: 16 (ref 9–23)
CALCIUM SERPL-MCNC: 9 MG/DL (ref 8.7–10.2)
CHLORIDE SERPL-SCNC: 102 MMOL/L (ref 96–106)
CO2 SERPL-SCNC: 22 MMOL/L (ref 20–29)
CREAT SERPL-MCNC: 0.62 MG/DL (ref 0.57–1)
GLOBULIN SER CALC-MCNC: 2.4 G/DL (ref 1.5–4.5)
GLUCOSE SERPL-MCNC: 89 MG/DL (ref 65–99)
POTASSIUM SERPL-SCNC: 3.9 MMOL/L (ref 3.5–5.2)
PROT SERPL-MCNC: 6.3 G/DL (ref 6–8.5)
SODIUM SERPL-SCNC: 137 MMOL/L (ref 134–144)
T4 FREE SERPL-MCNC: 1.05 NG/DL (ref 0.82–1.77)
TSH SERPL DL<=0.005 MIU/L-ACNC: 0.7 UIU/ML (ref 0.45–4.5)

## 2019-04-04 NOTE — PROGRESS NOTES
Mail results to pt, no treatment changes at this time. Thyroid labs are normal, no changes at this time

## 2019-04-08 ENCOUNTER — TELEPHONE (OUTPATIENT)
Dept: ENDOCRINOLOGY | Age: 34
End: 2019-04-08

## 2019-04-08 NOTE — TELEPHONE ENCOUNTER
Spoke with patient about lab results let patient know that thyroid labs are normal no change at this time patient voice understanding          ----- Message from Salome Galo sent at 4/4/2019 10:34 AM EDT -----  Contact: patient  Patient cancelled her appointment on 4-8-19 at 10:40am since you and Dr. Wolff are out of office and returning on 4-8-19.    She is asking if she can discuss labs and medication over the phone instead of coming to an appointment, due to she has so many doctors appointments scheduled.    Pt - [phone]

## 2019-04-24 ENCOUNTER — TELEPHONE (OUTPATIENT)
Dept: ENDOCRINOLOGY | Age: 34
End: 2019-04-24

## 2019-05-01 ENCOUNTER — RESULTS ENCOUNTER (OUTPATIENT)
Dept: INTERNAL MEDICINE | Facility: CLINIC | Age: 34
End: 2019-05-01

## 2019-05-01 DIAGNOSIS — Z15.89 HETEROZYGOUS FOR MTHFR GENE MUTATION: ICD-10-CM

## 2019-05-01 DIAGNOSIS — Z00.00 HEALTH CARE MAINTENANCE: ICD-10-CM

## 2019-05-01 DIAGNOSIS — E03.8 OTHER SPECIFIED HYPOTHYROIDISM: ICD-10-CM

## 2019-07-12 ENCOUNTER — HOSPITAL ENCOUNTER (OUTPATIENT)
Facility: HOSPITAL | Age: 34
Setting detail: OBSERVATION
Discharge: HOME | End: 2019-07-13
Attending: OBSTETRICS & GYNECOLOGY | Admitting: OBSTETRICS & GYNECOLOGY

## 2019-07-12 ENCOUNTER — APPOINTMENT (OUTPATIENT)
Dept: ULTRASOUND IMAGING | Facility: HOSPITAL | Age: 34
End: 2019-07-12

## 2019-07-12 PROBLEM — Z34.90 PREGNANCY: Status: ACTIVE | Noted: 2019-07-12

## 2019-07-12 PROCEDURE — 76805 OB US >/= 14 WKS SNGL FETUS: CPT

## 2019-07-12 PROCEDURE — G0378 HOSPITAL OBSERVATION PER HR: HCPCS

## 2019-07-12 PROCEDURE — 96372 THER/PROPH/DIAG INJ SC/IM: CPT

## 2019-07-12 PROCEDURE — 76810 OB US >/= 14 WKS ADDL FETUS: CPT

## 2019-07-12 PROCEDURE — 76820 UMBILICAL ARTERY ECHO: CPT

## 2019-07-12 PROCEDURE — 76817 TRANSVAGINAL US OBSTETRIC: CPT

## 2019-07-12 PROCEDURE — 25010000002 BETAMETHASONE ACET & SOD PHOS PER 4 MG: Performed by: OBSTETRICS & GYNECOLOGY

## 2019-07-12 RX ORDER — BETAMETHASONE SODIUM PHOSPHATE AND BETAMETHASONE ACETATE 3; 3 MG/ML; MG/ML
12 INJECTION, SUSPENSION INTRA-ARTICULAR; INTRALESIONAL; INTRAMUSCULAR; SOFT TISSUE EVERY 12 HOURS
Status: COMPLETED | OUTPATIENT
Start: 2019-07-12 | End: 2019-07-13

## 2019-07-12 RX ADMIN — BETAMETHASONE SODIUM PHOSPHATE AND BETAMETHASONE ACETATE 12 MG: 3; 3 INJECTION, SUSPENSION INTRA-ARTICULAR; INTRALESIONAL; INTRAMUSCULAR at 20:04

## 2019-07-12 NOTE — CONSULTS
Ms. Devi is referred by Dr Guerrero for MFM consultation on indications of:  1) Di-di twins at 35 1/7  2) Fetus A with growth restriction, suspected T21 (+cfDNA screen), AV canal  3) 22% growth discordance    She was initially unaccompanied during today's exam but her  arrived near the end of the exam.     34  at 35 1/7 per KELSI 8/15/19    Prenatal course is significant for: the above history  BPPs were reported as normal earlier today in Dr. Guerrero's office.     PMH  Obstetric History       T0      L0     SAB3   TAB0   Ectopic0   Molar0   Multiple0   Live Births0       # Outcome Date GA Lbr Yuri/2nd Weight Sex Delivery Anes PTL Lv   4 Current            3 SAB            2 SAB            1 SAB                   Past Medical History:   Diagnosis Date   • Abdominal pain    • Abnormal Pap smear of cervix     biopsy negative   • Acute epigastric pain    • Anxiety    • Contraception     managerd by Sky Gonzalez    • Disease of thyroid gland    • Female infertility    • Miscarriage    • Nausea        No Known Allergies    Past Surgical History:   Procedure Laterality Date   • ADENOIDECTOMY     • D&C WITH SUCTION N/A 2017    Procedure: DILATATION AND CURETTAGE WITH SUCTION--6.1 weeks gestation;  Surgeon: Maureen York MD;  Location: [facility];  Service:    • DILATATION AND CURETTAGE     • KNEE ARTHROSCOPY     • TONSILLECTOMY     • WISDOM TOOTH EXTRACTION           No current facility-administered medications for this encounter.       Family history is negative for mental retardation, trisomy 21, congenital heart disease, spina bifida, cystic fibrosis, muscular dystrophy, other birth defects, [employer] ancestry.   The patient has a family history of: none of the above.    Did not do cystic fibrosis carrier screen.  I provided a copy of the pamphlets on cystic fibrosis and spinal muscle atrophy carrier screen.  I  briefly described cystic fibrosis and spinal muscle atrophy including  "availability of carrier screening for these conditions thru her obstetrician's office.      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     Continued gestation is indicated pending labor or Doppler changes      Recommend:  Twice weekly BPP with UA Dopplers  Next BPP and Dopplers Monday at  [facility]  This exam has not yet been scheduled.     Indications for delivery include:   38 weeks gestation  Labor  Abnormal Dopplers   Other medical or obstetrical indications (eg hypertension)    Suggest serum ferritin to assess iron stores/need for  supplementation    Observation & assessment this evening in L&D for labor    Suggest betamethasone administration for augmentation of pulmonary maturity.     Above discussed with Dr. Guerrero.        medicine consultation regarding anticipated  management of fetus A.       Floor time 60 minutes  "

## 2019-07-13 VITALS
HEIGHT: 65 IN | BODY MASS INDEX: 29.49 KG/M2 | SYSTOLIC BLOOD PRESSURE: 125 MMHG | TEMPERATURE: 98 F | RESPIRATION RATE: 18 BRPM | HEART RATE: 80 BPM | WEIGHT: 177 LBS | DIASTOLIC BLOOD PRESSURE: 68 MMHG

## 2019-07-13 PROCEDURE — 96372 THER/PROPH/DIAG INJ SC/IM: CPT

## 2019-07-13 PROCEDURE — G0378 HOSPITAL OBSERVATION PER HR: HCPCS

## 2019-07-13 PROCEDURE — 59025 FETAL NON-STRESS TEST: CPT

## 2019-07-13 PROCEDURE — 25010000002 BETAMETHASONE ACET & SOD PHOS PER 4 MG: Performed by: OBSTETRICS & GYNECOLOGY

## 2019-07-13 RX ADMIN — BETAMETHASONE SODIUM PHOSPHATE AND BETAMETHASONE ACETATE 12 MG: 3; 3 INJECTION, SUSPENSION INTRA-ARTICULAR; INTRALESIONAL; INTRAMUSCULAR at 09:52

## 2019-07-13 NOTE — CONSULTS
Prenatal Consult    Referring OB:  Andi  Active Problems:    Pregnancy  Assessment:  1) Di-di twins at 35 1/7  2) Fetus A with growth restriction, suspected T21 (+cfDNA screen), AV canal  3) 22% growth discordance        Reason for Consultation: potential premature twins at 35 2/7 weeks gestation, twin A with suspected Trisomy 21 and CHD (complete balanced AV Canal per report)    Maternal History:   Cecille Devi is a 34 y.o. yr/o  with: twin gestation, twin A with suspected trisomy 21 and CHD.  The  EDC is: 8/15/19    Consult:  father and mother available for discussion.  We reviewed the fetal and  aspects of the above conditions to include: beneficial effects of steroids, potential needs of twin A and plans for care after infants are delivered (ECHO, Cardiology call, etc). We also discussed plans if infants are delivered earlier than planned  day such as potential need for respiratory assistance, slow feeding, breastmilk, criteria for D/C.    Estimated length of stay: KELSI  Discussed the importance of providing human milk for pre-term and late pre-term infants: yes  Reviewed policies and procedures for NICU/ICN  Reviewed structure and function of [facility]     We will plan to attend delivery when requested.    Plan for  resuscitation: not discussed    Additional comments: Mother is being discharged to home, planning to return for  on 2019. I encouraged them to call with additional concerns/questions.      Thank you for allowing us to participate in the care of your patient.     Tiffanie Zhang, APRN  19  11:18 AM      20 minutes were spent in consultation, greater than 75% face to face time.

## 2019-07-13 NOTE — PLAN OF CARE
Problem: Patient Care Overview  Goal: Plan of Care Review  Outcome: Ongoing (interventions implemented as appropriate)   07/13/19 0656   Coping/Psychosocial   Plan of Care Reviewed With patient   Plan of Care Review   Progress improving   OTHER   Outcome Summary Planning on BMZ at 0800, recheck cervix, d/c home if still closed. All needs met per staff, No s/s of distress noted or voiced.     Goal: Individualization and Mutuality   07/13/19 0656   Mutuality/Individual Preferences   What Anxieties, Fears, Concerns, or Questions Do You Have About Your Care? Baby A's health   What Information Would Help Us Give You More Personalized Care? Keep updated with POC   How Would You and/or Your Support Person Like to Participate in Your Care? FOB Be at bedside as much as possible   Mutuality/Individual Preferences   How to Address Anxieties/Fears Keep updated with POC     Goal: Discharge Needs Assessment  Outcome: Ongoing (interventions implemented as appropriate)   07/13/19 0656   Discharge Needs Assessment   Readmission Within the Last 30 Days no previous admission in last 30 days   Concerns to be Addressed no discharge needs identified   Patient/Family Anticipates Transition to home   Patient/Family Anticipated Services at Transition none   Transportation Concerns car, none   Transportation Anticipated family or friend will provide   Anticipated Changes Related to Illness none   Equipment Needed After Discharge none   Offered/Gave Vendor List no   Disability   Equipment Currently Used at Home none     Goal: Interprofessional Rounds/Family Conf  Outcome: Ongoing (interventions implemented as appropriate)   07/13/19 0656   Interdisciplinary Rounds/Family Conf   Summary Discussed POC and medication   Participants patient;nursing       Problem: Pregnancy, Multiple Gestation (Adult,Obstetrics,Pediatric)  Goal: Signs and Symptoms of Listed Potential Problems Will be Absent, Minimized or Managed (Pregnancy, Multiple  Gestation)  Outcome: Ongoing (interventions implemented as appropriate)

## 2019-07-13 NOTE — PLAN OF CARE
Problem: Patient Care Overview  Goal: Plan of Care Review  Outcome: Outcome(s) achieved Date Met: 07/13/19 07/13/19 0656 07/13/19 0831   Coping/Psychosocial   Plan of Care Reviewed With --  patient   Plan of Care Review   Progress improving --    OTHER   Outcome Summary Planning on BMZ at 0800, recheck cervix, d/c home if still closed. All needs met per staff, No s/s of distress noted or voiced. --      Goal: Individualization and Mutuality  Outcome: Outcome(s) achieved Date Met: 07/13/19 07/13/19 0656   Mutuality/Individual Preferences   What Anxieties, Fears, Concerns, or Questions Do You Have About Your Care? Baby A's health   What Information Would Help Us Give You More Personalized Care? Keep updated with POC   How Would You and/or Your Support Person Like to Participate in Your Care? FOB Be at bedside as much as possible   Mutuality/Individual Preferences   How to Address Anxieties/Fears Keep updated with POC     Goal: Discharge Needs Assessment  Outcome: Outcome(s) achieved Date Met: 07/13/19 07/13/19 0656   Discharge Needs Assessment   Readmission Within the Last 30 Days no previous admission in last 30 days   Concerns to be Addressed no discharge needs identified   Patient/Family Anticipates Transition to home   Patient/Family Anticipated Services at Transition none   Transportation Concerns car, none   Transportation Anticipated family or friend will provide   Anticipated Changes Related to Illness none   Equipment Needed After Discharge none   Offered/Gave Vendor List no   Disability   Equipment Currently Used at Home none     Goal: Interprofessional Rounds/Family Conf  Outcome: Outcome(s) achieved Date Met: 07/13/19 07/13/19 0656   Interdisciplinary Rounds/Family Conf   Summary Discussed POC and medication   Participants patient;nursing       Problem: Pregnancy, Multiple Gestation (Adult,Obstetrics,Pediatric)  Goal: Signs and Symptoms of Listed Potential Problems Will be Absent, Minimized or  Managed (Pregnancy, Multiple Gestation)  Outcome: Outcome(s) achieved Date Met: 07/13/19

## 2019-07-13 NOTE — NON STRESS TEST
Cecille Devi, a  at 35w2d with an KELSI of 8/15/2019, by Patient Reported, was seen at [facility] for a nonstress test.    Chief Complaint   Patient presents with   • Di/Di Twins     pt sent from office for us in the [facility] for growth scan because baby a measured small in the office       Patient Active Problem List   Diagnosis   • Abdominal pain   • Epigastric pain   • Nausea   • Anxiety   • Elevated liver enzymes   • Interstitial cystitis   • Hypothyroidism   • Heterozygous for MTHFR gene mutation (CMS/HCC)   • Hereditary hemochromatosis (CMS/HCC)   • Pregnancy       Start Time:308  Stop Time:359     Reactive NST

## 2019-07-13 NOTE — NON STRESS TEST
Cecille Devi, a  at 35w2d with an KELSI of 8/15/2019, by Patient Reported, was seen at [facility] LABOR DELIVERY for a nonstress test.    Chief Complaint   Patient presents with   • Di/Di Twins     pt sent from office for us in the [facility] for growth scan because baby a measured small in the office       Patient Active Problem List   Diagnosis   • Abdominal pain   • Epigastric pain   • Nausea   • Anxiety   • Elevated liver enzymes   • Interstitial cystitis   • Hypothyroidism   • Heterozygous for MTHFR gene mutation (CMS/HCC)   • Hereditary hemochromatosis (CMS/HCC)   • Pregnancy       Start Time: 824  Stop Time: 941

## 2019-07-19 LAB — EXTERNAL GROUP B STREP ANTIGEN: NEGATIVE

## 2019-07-22 ENCOUNTER — HOSPITAL ENCOUNTER (OUTPATIENT)
Facility: HOSPITAL | Age: 34
Setting detail: OBSERVATION
Discharge: HOME | End: 2019-07-22
Attending: OBSTETRICS & GYNECOLOGY | Admitting: OBSTETRICS & GYNECOLOGY

## 2019-07-22 VITALS
DIASTOLIC BLOOD PRESSURE: 76 MMHG | HEART RATE: 82 BPM | OXYGEN SATURATION: 99 % | TEMPERATURE: 97.9 F | SYSTOLIC BLOOD PRESSURE: 121 MMHG | WEIGHT: 179 LBS | HEIGHT: 65 IN | BODY MASS INDEX: 29.82 KG/M2 | RESPIRATION RATE: 16 BRPM

## 2019-07-22 PROCEDURE — 59025 FETAL NON-STRESS TEST: CPT

## 2019-07-22 PROCEDURE — G0378 HOSPITAL OBSERVATION PER HR: HCPCS

## 2019-07-25 ENCOUNTER — ANESTHESIA EVENT (OUTPATIENT)
Dept: LABOR AND DELIVERY | Facility: HOSPITAL | Age: 34
End: 2019-07-25

## 2019-07-25 ENCOUNTER — ANESTHESIA (OUTPATIENT)
Dept: LABOR AND DELIVERY | Facility: HOSPITAL | Age: 34
End: 2019-07-25

## 2019-07-25 ENCOUNTER — HOSPITAL ENCOUNTER (INPATIENT)
Facility: HOSPITAL | Age: 34
LOS: 4 days | Discharge: HOME | End: 2019-07-29
Attending: OBSTETRICS & GYNECOLOGY | Admitting: OBSTETRICS & GYNECOLOGY

## 2019-07-25 PROBLEM — Z34.90 PREGNANT: Status: ACTIVE | Noted: 2019-07-25

## 2019-07-25 LAB
ABO GROUP BLD: NORMAL
BASOPHILS # BLD AUTO: 0.06 10*3/MM3 (ref 0–0.2)
BASOPHILS NFR BLD AUTO: 0.5 % (ref 0–1.5)
BLD GP AB SCN SERPL QL: NEGATIVE
DEPRECATED RDW RBC AUTO: 48.8 FL (ref 37–54)
EOSINOPHIL # BLD AUTO: 0.16 10*3/MM3 (ref 0–0.4)
EOSINOPHIL NFR BLD AUTO: 1.3 % (ref 0.3–6.2)
ERYTHROCYTE [DISTWIDTH] IN BLOOD BY AUTOMATED COUNT: 14.2 % (ref 12.3–15.4)
HCT VFR BLD AUTO: 36.2 % (ref 34–46.6)
HGB BLD-MCNC: 12.3 G/DL (ref 12–15.9)
IMM GRANULOCYTES # BLD AUTO: 0.34 10*3/MM3 (ref 0–0.05)
IMM GRANULOCYTES NFR BLD AUTO: 2.7 % (ref 0–0.5)
LYMPHOCYTES # BLD AUTO: 1.78 10*3/MM3 (ref 0.7–3.1)
LYMPHOCYTES NFR BLD AUTO: 14 % (ref 19.6–45.3)
MCH RBC QN AUTO: 31.9 PG (ref 26.6–33)
MCHC RBC AUTO-ENTMCNC: 34 G/DL (ref 31.5–35.7)
MCV RBC AUTO: 94 FL (ref 79–97)
MONOCYTES # BLD AUTO: 1.42 10*3/MM3 (ref 0.1–0.9)
MONOCYTES NFR BLD AUTO: 11.1 % (ref 5–12)
NEUTROPHILS # BLD AUTO: 8.99 10*3/MM3 (ref 1.7–7)
NEUTROPHILS NFR BLD AUTO: 70.4 % (ref 42.7–76)
NRBC BLD AUTO-RTO: 0 /100 WBC (ref 0–0.2)
PLATELET # BLD AUTO: 177 10*3/MM3 (ref 140–450)
PMV BLD AUTO: 11.5 FL (ref 6–12)
RBC # BLD AUTO: 3.85 10*6/MM3 (ref 3.77–5.28)
RH BLD: POSITIVE
T&S EXPIRATION DATE: NORMAL
WBC NRBC COR # BLD: 12.75 10*3/MM3 (ref 3.4–10.8)

## 2019-07-25 PROCEDURE — 63710000001 DIPHENHYDRAMINE PER 50 MG: Performed by: NURSE ANESTHETIST, CERTIFIED REGISTERED

## 2019-07-25 PROCEDURE — 86901 BLOOD TYPING SEROLOGIC RH(D): CPT | Performed by: OBSTETRICS & GYNECOLOGY

## 2019-07-25 PROCEDURE — 85025 COMPLETE CBC W/AUTO DIFF WBC: CPT | Performed by: OBSTETRICS & GYNECOLOGY

## 2019-07-25 PROCEDURE — 88307 TISSUE EXAM BY PATHOLOGIST: CPT

## 2019-07-25 PROCEDURE — 86900 BLOOD TYPING SEROLOGIC ABO: CPT | Performed by: OBSTETRICS & GYNECOLOGY

## 2019-07-25 PROCEDURE — 25010000003 MORPHINE PER 10 MG: Performed by: NURSE ANESTHETIST, CERTIFIED REGISTERED

## 2019-07-25 PROCEDURE — 25010000003 CEFAZOLIN IN DEXTROSE 2-4 GM/100ML-% SOLUTION: Performed by: OBSTETRICS & GYNECOLOGY

## 2019-07-25 PROCEDURE — 86850 RBC ANTIBODY SCREEN: CPT | Performed by: OBSTETRICS & GYNECOLOGY

## 2019-07-25 PROCEDURE — 25010000002 PHENYLEPHRINE PER 1 ML: Performed by: NURSE ANESTHETIST, CERTIFIED REGISTERED

## 2019-07-25 PROCEDURE — 25010000002 HYDROMORPHONE PER 4 MG: Performed by: NURSE ANESTHETIST, CERTIFIED REGISTERED

## 2019-07-25 PROCEDURE — 25010000002 ONDANSETRON PER 1 MG: Performed by: ANESTHESIOLOGY

## 2019-07-25 PROCEDURE — 25010000002 METHYLERGONOVINE MALEATE PER 0.2 MG: Performed by: OBSTETRICS & GYNECOLOGY

## 2019-07-25 PROCEDURE — 25010000002 ONDANSETRON PER 1 MG: Performed by: NURSE ANESTHETIST, CERTIFIED REGISTERED

## 2019-07-25 PROCEDURE — 25010000002 PROMETHAZINE PER 50 MG: Performed by: OBSTETRICS & GYNECOLOGY

## 2019-07-25 RX ORDER — OXYTOCIN-SODIUM CHLORIDE 0.9% IV SOLN 30 UNIT/500ML 30-0.9/5 UT/ML-%
250 SOLUTION INTRAVENOUS CONTINUOUS
Status: DISPENSED | OUTPATIENT
Start: 2019-07-25 | End: 2019-07-25

## 2019-07-25 RX ORDER — LIDOCAINE HYDROCHLORIDE 10 MG/ML
5 INJECTION, SOLUTION EPIDURAL; INFILTRATION; INTRACAUDAL; PERINEURAL AS NEEDED
Status: DISCONTINUED | OUTPATIENT
Start: 2019-07-25 | End: 2019-07-25 | Stop reason: HOSPADM

## 2019-07-25 RX ORDER — ERYTHROMYCIN 5 MG/G
OINTMENT OPHTHALMIC
Status: DISPENSED
Start: 2019-07-25 | End: 2019-07-25

## 2019-07-25 RX ORDER — DIPHENHYDRAMINE HCL 25 MG
25 CAPSULE ORAL EVERY 4 HOURS PRN
Status: DISCONTINUED | OUTPATIENT
Start: 2019-07-25 | End: 2019-07-29 | Stop reason: HOSPADM

## 2019-07-25 RX ORDER — ACETAMINOPHEN 500 MG
1000 TABLET ORAL ONCE
Status: COMPLETED | OUTPATIENT
Start: 2019-07-25 | End: 2019-07-25

## 2019-07-25 RX ORDER — SODIUM CHLORIDE, SODIUM LACTATE, POTASSIUM CHLORIDE, CALCIUM CHLORIDE 600; 310; 30; 20 MG/100ML; MG/100ML; MG/100ML; MG/100ML
125 INJECTION, SOLUTION INTRAVENOUS CONTINUOUS
Status: DISCONTINUED | OUTPATIENT
Start: 2019-07-25 | End: 2019-07-25

## 2019-07-25 RX ORDER — SODIUM CHLORIDE, SODIUM LACTATE, POTASSIUM CHLORIDE, CALCIUM CHLORIDE 600; 310; 30; 20 MG/100ML; MG/100ML; MG/100ML; MG/100ML
125 INJECTION, SOLUTION INTRAVENOUS CONTINUOUS
Status: DISCONTINUED | OUTPATIENT
Start: 2019-07-25 | End: 2019-07-29 | Stop reason: HOSPADM

## 2019-07-25 RX ORDER — MISOPROSTOL 200 UG/1
600 TABLET ORAL ONCE AS NEEDED
Status: DISCONTINUED | OUTPATIENT
Start: 2019-07-25 | End: 2019-07-29 | Stop reason: HOSPADM

## 2019-07-25 RX ORDER — ONDANSETRON 2 MG/ML
4 INJECTION INTRAMUSCULAR; INTRAVENOUS ONCE AS NEEDED
Status: COMPLETED | OUTPATIENT
Start: 2019-07-25 | End: 2019-07-25

## 2019-07-25 RX ORDER — SODIUM CHLORIDE 0.9 % (FLUSH) 0.9 %
3 SYRINGE (ML) INJECTION EVERY 12 HOURS SCHEDULED
Status: DISCONTINUED | OUTPATIENT
Start: 2019-07-25 | End: 2019-07-25 | Stop reason: HOSPADM

## 2019-07-25 RX ORDER — MORPHINE SULFATE 0.5 MG/ML
INJECTION, SOLUTION EPIDURAL; INTRATHECAL; INTRAVENOUS AS NEEDED
Status: DISCONTINUED | OUTPATIENT
Start: 2019-07-25 | End: 2019-07-25 | Stop reason: SURG

## 2019-07-25 RX ORDER — OXYCODONE HYDROCHLORIDE AND ACETAMINOPHEN 5; 325 MG/1; MG/1
1 TABLET ORAL EVERY 4 HOURS PRN
Status: DISCONTINUED | OUTPATIENT
Start: 2019-07-25 | End: 2019-07-29 | Stop reason: HOSPADM

## 2019-07-25 RX ORDER — IBUPROFEN 800 MG/1
800 TABLET ORAL EVERY 8 HOURS PRN
Status: DISCONTINUED | OUTPATIENT
Start: 2019-07-25 | End: 2019-07-29 | Stop reason: HOSPADM

## 2019-07-25 RX ORDER — BUPIVACAINE HYDROCHLORIDE 7.5 MG/ML
INJECTION, SOLUTION EPIDURAL; RETROBULBAR AS NEEDED
Status: DISCONTINUED | OUTPATIENT
Start: 2019-07-25 | End: 2019-07-25 | Stop reason: SURG

## 2019-07-25 RX ORDER — OXYTOCIN-SODIUM CHLORIDE 0.9% IV SOLN 30 UNIT/500ML 30-0.9/5 UT/ML-%
125 SOLUTION INTRAVENOUS CONTINUOUS PRN
Status: COMPLETED | OUTPATIENT
Start: 2019-07-25 | End: 2019-07-25

## 2019-07-25 RX ORDER — BISACODYL 10 MG
10 SUPPOSITORY, RECTAL RECTAL DAILY PRN
Status: DISCONTINUED | OUTPATIENT
Start: 2019-07-25 | End: 2019-07-29 | Stop reason: HOSPADM

## 2019-07-25 RX ORDER — ONDANSETRON 4 MG/1
4 TABLET, FILM COATED ORAL EVERY 8 HOURS PRN
Status: DISCONTINUED | OUTPATIENT
Start: 2019-07-25 | End: 2019-07-29 | Stop reason: HOSPADM

## 2019-07-25 RX ORDER — HYDROMORPHONE HYDROCHLORIDE 1 MG/ML
0.5 INJECTION, SOLUTION INTRAMUSCULAR; INTRAVENOUS; SUBCUTANEOUS
Status: DISCONTINUED | OUTPATIENT
Start: 2019-07-25 | End: 2019-07-25 | Stop reason: HOSPADM

## 2019-07-25 RX ORDER — FAMOTIDINE 10 MG/ML
20 INJECTION, SOLUTION INTRAVENOUS ONCE AS NEEDED
Status: COMPLETED | OUTPATIENT
Start: 2019-07-25 | End: 2019-07-25

## 2019-07-25 RX ORDER — MORPHINE SULFATE 2 MG/ML
2 INJECTION, SOLUTION INTRAMUSCULAR; INTRAVENOUS
Status: ACTIVE | OUTPATIENT
Start: 2019-07-25 | End: 2019-07-26

## 2019-07-25 RX ORDER — PROMETHAZINE HYDROCHLORIDE 25 MG/ML
12.5 INJECTION, SOLUTION INTRAMUSCULAR; INTRAVENOUS EVERY 6 HOURS PRN
Status: DISCONTINUED | OUTPATIENT
Start: 2019-07-25 | End: 2019-07-29 | Stop reason: HOSPADM

## 2019-07-25 RX ORDER — OXYTOCIN-SODIUM CHLORIDE 0.9% IV SOLN 30 UNIT/500ML 30-0.9/5 UT/ML-%
999 SOLUTION INTRAVENOUS ONCE
Status: COMPLETED | OUTPATIENT
Start: 2019-07-25 | End: 2019-07-25

## 2019-07-25 RX ORDER — PROMETHAZINE HYDROCHLORIDE 25 MG/1
25 TABLET ORAL EVERY 6 HOURS PRN
Status: DISCONTINUED | OUTPATIENT
Start: 2019-07-25 | End: 2019-07-29 | Stop reason: HOSPADM

## 2019-07-25 RX ORDER — MISOPROSTOL 200 UG/1
800 TABLET ORAL AS NEEDED
Status: DISCONTINUED | OUTPATIENT
Start: 2019-07-25 | End: 2019-07-25 | Stop reason: HOSPADM

## 2019-07-25 RX ORDER — METHENAMINE, SODIUM PHOSPHATE, MONOBASIC, MONOHYDRATE, PHENYL SALICYLATE, METHYLENE BLUE, AND HYOSCYAMINE SULFATE 120; 40.8; 36; 10; .12 MG/1; MG/1; MG/1; MG/1; MG/1
1 CAPSULE ORAL
COMMUNITY
Start: 2017-04-27 | End: 2019-07-29 | Stop reason: HOSPADM

## 2019-07-25 RX ORDER — SIMETHICONE 80 MG
80 TABLET,CHEWABLE ORAL 4 TIMES DAILY PRN
Status: DISCONTINUED | OUTPATIENT
Start: 2019-07-25 | End: 2019-07-29 | Stop reason: HOSPADM

## 2019-07-25 RX ORDER — DIPHENHYDRAMINE HYDROCHLORIDE 50 MG/ML
25 INJECTION INTRAMUSCULAR; INTRAVENOUS EVERY 4 HOURS PRN
Status: DISCONTINUED | OUTPATIENT
Start: 2019-07-25 | End: 2019-07-29 | Stop reason: HOSPADM

## 2019-07-25 RX ORDER — NALOXONE HCL 0.4 MG/ML
0.4 VIAL (ML) INJECTION
Status: DISCONTINUED | OUTPATIENT
Start: 2019-07-25 | End: 2019-07-29 | Stop reason: HOSPADM

## 2019-07-25 RX ORDER — SODIUM CHLORIDE 0.9 % (FLUSH) 0.9 %
3-10 SYRINGE (ML) INJECTION AS NEEDED
Status: DISCONTINUED | OUTPATIENT
Start: 2019-07-25 | End: 2019-07-25 | Stop reason: HOSPADM

## 2019-07-25 RX ORDER — LANOLIN
CREAM (ML) TOPICAL
Status: DISCONTINUED | OUTPATIENT
Start: 2019-07-25 | End: 2019-07-29 | Stop reason: HOSPADM

## 2019-07-25 RX ORDER — METHYLERGONOVINE MALEATE 0.2 MG/ML
200 INJECTION INTRAVENOUS ONCE AS NEEDED
Status: COMPLETED | OUTPATIENT
Start: 2019-07-25 | End: 2019-07-25

## 2019-07-25 RX ORDER — MORPHINE SULFATE 2 MG/ML
2 INJECTION, SOLUTION INTRAMUSCULAR; INTRAVENOUS EVERY 4 HOURS PRN
Status: DISCONTINUED | OUTPATIENT
Start: 2019-07-25 | End: 2019-07-29 | Stop reason: HOSPADM

## 2019-07-25 RX ORDER — NALOXONE HCL 0.4 MG/ML
0.2 VIAL (ML) INJECTION
Status: DISCONTINUED | OUTPATIENT
Start: 2019-07-25 | End: 2019-07-29 | Stop reason: HOSPADM

## 2019-07-25 RX ORDER — SODIUM CHLORIDE 0.9 % (FLUSH) 0.9 %
1-10 SYRINGE (ML) INJECTION AS NEEDED
Status: DISCONTINUED | OUTPATIENT
Start: 2019-07-25 | End: 2019-07-29 | Stop reason: HOSPADM

## 2019-07-25 RX ORDER — HYDROXYZINE 50 MG/1
50 TABLET, FILM COATED ORAL EVERY 6 HOURS PRN
Status: DISCONTINUED | OUTPATIENT
Start: 2019-07-25 | End: 2019-07-29 | Stop reason: HOSPADM

## 2019-07-25 RX ORDER — SODIUM CHLORIDE 0.9 % (FLUSH) 0.9 %
3 SYRINGE (ML) INJECTION EVERY 12 HOURS SCHEDULED
Status: DISCONTINUED | OUTPATIENT
Start: 2019-07-25 | End: 2019-07-29 | Stop reason: HOSPADM

## 2019-07-25 RX ORDER — MORPHINE SULFATE 1 MG/ML
INJECTION, SOLUTION EPIDURAL; INTRATHECAL; INTRAVENOUS
Status: DISPENSED
Start: 2019-07-25 | End: 2019-07-25

## 2019-07-25 RX ORDER — DOCUSATE SODIUM 100 MG/1
100 CAPSULE, LIQUID FILLED ORAL 2 TIMES DAILY PRN
Status: DISCONTINUED | OUTPATIENT
Start: 2019-07-25 | End: 2019-07-29 | Stop reason: HOSPADM

## 2019-07-25 RX ORDER — CEFAZOLIN SODIUM 2 G/100ML
2 INJECTION, SOLUTION INTRAVENOUS ONCE
Status: COMPLETED | OUTPATIENT
Start: 2019-07-25 | End: 2019-07-25

## 2019-07-25 RX ORDER — OXYCODONE AND ACETAMINOPHEN 10; 325 MG/1; MG/1
1 TABLET ORAL EVERY 4 HOURS PRN
Status: DISCONTINUED | OUTPATIENT
Start: 2019-07-25 | End: 2019-07-29 | Stop reason: HOSPADM

## 2019-07-25 RX ORDER — ONDANSETRON 2 MG/ML
4 INJECTION INTRAMUSCULAR; INTRAVENOUS EVERY 8 HOURS PRN
Status: DISCONTINUED | OUTPATIENT
Start: 2019-07-25 | End: 2019-07-29 | Stop reason: HOSPADM

## 2019-07-25 RX ORDER — PHYTONADIONE 2 MG/ML
INJECTION, EMULSION INTRAMUSCULAR; INTRAVENOUS; SUBCUTANEOUS
Status: DISPENSED
Start: 2019-07-25 | End: 2019-07-25

## 2019-07-25 RX ORDER — PROMETHAZINE HYDROCHLORIDE 25 MG/1
12.5 SUPPOSITORY RECTAL EVERY 6 HOURS PRN
Status: DISCONTINUED | OUTPATIENT
Start: 2019-07-25 | End: 2019-07-29 | Stop reason: HOSPADM

## 2019-07-25 RX ORDER — CARBOPROST TROMETHAMINE 250 UG/ML
250 INJECTION, SOLUTION INTRAMUSCULAR AS NEEDED
Status: DISCONTINUED | OUTPATIENT
Start: 2019-07-25 | End: 2019-07-25 | Stop reason: HOSPADM

## 2019-07-25 RX ADMIN — OXYTOCIN 125 ML/HR: 10 INJECTION INTRAVENOUS at 10:00

## 2019-07-25 RX ADMIN — PHENYLEPHRINE HYDROCHLORIDE 100 MCG: 10 INJECTION INTRAVENOUS at 08:37

## 2019-07-25 RX ADMIN — DIPHENHYDRAMINE HYDROCHLORIDE 25 MG: 25 CAPSULE ORAL at 10:10

## 2019-07-25 RX ADMIN — CEFAZOLIN SODIUM 2 G: 2 INJECTION, SOLUTION INTRAVENOUS at 07:53

## 2019-07-25 RX ADMIN — ONDANSETRON 4 MG: 2 INJECTION INTRAMUSCULAR; INTRAVENOUS at 07:38

## 2019-07-25 RX ADMIN — HYDROMORPHONE HYDROCHLORIDE 0.5 MG: 1 INJECTION, SOLUTION INTRAMUSCULAR; INTRAVENOUS; SUBCUTANEOUS at 10:15

## 2019-07-25 RX ADMIN — BUPIVACAINE HYDROCHLORIDE 1.7 ML: 7.5 INJECTION, SOLUTION EPIDURAL; RETROBULBAR at 08:02

## 2019-07-25 RX ADMIN — SODIUM CHLORIDE, POTASSIUM CHLORIDE, SODIUM LACTATE AND CALCIUM CHLORIDE 125 ML/HR: 600; 310; 30; 20 INJECTION, SOLUTION INTRAVENOUS at 15:00

## 2019-07-25 RX ADMIN — FAMOTIDINE 20 MG: 10 INJECTION INTRAVENOUS at 07:35

## 2019-07-25 RX ADMIN — MORPHINE SULFATE 0.3 MG: 0.5 INJECTION EPIDURAL; INTRATHECAL; INTRAVENOUS at 08:02

## 2019-07-25 RX ADMIN — Medication 1 APPLICATION: at 15:33

## 2019-07-25 RX ADMIN — HYDROMORPHONE HYDROCHLORIDE 0.5 MG: 1 INJECTION, SOLUTION INTRAMUSCULAR; INTRAVENOUS; SUBCUTANEOUS at 10:45

## 2019-07-25 RX ADMIN — ONDANSETRON HYDROCHLORIDE 4 MG: 2 SOLUTION INTRAMUSCULAR; INTRAVENOUS at 13:40

## 2019-07-25 RX ADMIN — SODIUM CHLORIDE, POTASSIUM CHLORIDE, SODIUM LACTATE AND CALCIUM CHLORIDE 125 ML/HR: 600; 310; 30; 20 INJECTION, SOLUTION INTRAVENOUS at 07:49

## 2019-07-25 RX ADMIN — SODIUM CHLORIDE, POTASSIUM CHLORIDE, SODIUM LACTATE AND CALCIUM CHLORIDE 1000 ML: 600; 310; 30; 20 INJECTION, SOLUTION INTRAVENOUS at 06:22

## 2019-07-25 RX ADMIN — ACETAMINOPHEN 1000 MG: 500 TABLET, FILM COATED ORAL at 07:40

## 2019-07-25 RX ADMIN — PROMETHAZINE HYDROCHLORIDE 12.5 MG: 25 INJECTION INTRAMUSCULAR; INTRAVENOUS at 15:22

## 2019-07-25 RX ADMIN — PHENYLEPHRINE HYDROCHLORIDE 100 MCG: 10 INJECTION INTRAVENOUS at 08:06

## 2019-07-25 RX ADMIN — METHYLERGONOVINE MALEATE 200 MCG: 0.2 INJECTION INTRAMUSCULAR; INTRAVENOUS at 08:33

## 2019-07-25 RX ADMIN — OXYTOCIN 999 ML/HR: 10 INJECTION INTRAVENOUS at 08:28

## 2019-07-25 RX ADMIN — IBUPROFEN 800 MG: 800 TABLET, FILM COATED ORAL at 18:48

## 2019-07-25 NOTE — ANESTHESIA PROCEDURE NOTES
Spinal Block      Patient location during procedure: OR  Performed By  CRNA: Karen Shore CRNA  Preanesthetic Checklist  Completed: patient identified, site marked, surgical consent, pre-op evaluation, timeout performed, IV checked, risks and benefits discussed and monitors and equipment checked  Spinal Block Prep:  Patient Position:sitting  Sterile Tech:cap, gloves, gown and sterile barriers  Prep:Chloraprep  Patient Monitoring:continuous pulse oximetry and EKG  Spinal Block Procedure  Approach:midline  Guidance:landmark technique  Location:L3-L4  Needle Type:Sprotte  Needle Gauge:24 G  Paresthesia: transient and right  Fluid Appearance:clear     Post Assessment  Patient Tolerance:patient tolerated the procedure well with no apparent complications  Complications no

## 2019-07-25 NOTE — ADDENDUM NOTE
Addendum  created 07/25/19 1135 by Karen Shore CRNA    Intraprocedure Blocks edited, Sign clinical note

## 2019-07-25 NOTE — ANESTHESIA POSTPROCEDURE EVALUATION
"Patient: Cecille Devi    Procedure Summary     Date:  19 Room / Location:   [facility]   [facility]    Anesthesia Start:  757 Anesthesia Stop:  904    Procedure:   SECTION PRIMARY (N/A Abdomen) Diagnosis:      Surgeon:  Maureen York MD Provider:  Josr Constantino MD    Anesthesia Type:  spinal ASA Status:  2          Anesthesia Type: spinal  Last vitals  BP   100/55 (19)   Temp   36.7 °C (98.1 °F) (19)   Pulse   70 (19)   Resp   18 (19)     SpO2   100 % (19)     Post Anesthesia Care and Evaluation    Patient location during evaluation: bedside  Patient participation: complete - patient participated  Level of consciousness: awake  Pain management: adequate  Airway patency: patent  Anesthetic complications: No anesthetic complications    Cardiovascular status: acceptable  Respiratory status: acceptable  Hydration status: acceptable    Comments: /55 (BP Location: Left arm, Patient Position: Lying)   Pulse 70   Temp 36.7 °C (98.1 °F) (Oral)   Resp 18   Ht 165.1 cm (65\")   Wt 81.6 kg (180 lb)   SpO2 100%   Breastfeeding? Yes   BMI 29.95 kg/m²         "

## 2019-07-25 NOTE — ANESTHESIA PREPROCEDURE EVALUATION
Anesthesia Evaluation                  Airway   Mallampati: II  Dental      Pulmonary - normal exam   (-) sleep apnea, not a smoker    ROS comment: Negative patient screen for EUFEMIA    Cardiovascular - normal exam        Neuro/Psych  (+) psychiatric history Anxiety,     GI/Hepatic/Renal/Endo    (+)   hypothyroidism,     Musculoskeletal     Abdominal    Substance History      OB/GYN    (+) Pregnant,         Other                        Anesthesia Plan    ASA 2     spinal     Anesthetic plan, all risks, benefits, and alternatives have been provided, discussed and informed consent has been obtained with: patient.

## 2019-07-26 PROBLEM — Z34.90 PREGNANT: Status: RESOLVED | Noted: 2019-07-25 | Resolved: 2019-07-26

## 2019-07-26 PROBLEM — D62 ACUTE BLOOD LOSS AS CAUSE OF POSTOPERATIVE ANEMIA: Status: ACTIVE | Noted: 2019-07-26

## 2019-07-26 LAB
BASOPHILS # BLD AUTO: 0.08 10*3/MM3 (ref 0–0.2)
BASOPHILS NFR BLD AUTO: 0.6 % (ref 0–1.5)
DEPRECATED RDW RBC AUTO: 51.8 FL (ref 37–54)
EOSINOPHIL # BLD AUTO: 0.12 10*3/MM3 (ref 0–0.4)
EOSINOPHIL NFR BLD AUTO: 0.9 % (ref 0.3–6.2)
ERYTHROCYTE [DISTWIDTH] IN BLOOD BY AUTOMATED COUNT: 14.6 % (ref 12.3–15.4)
HCT VFR BLD AUTO: 24.9 % (ref 34–46.6)
HGB BLD-MCNC: 8.2 G/DL (ref 12–15.9)
IMM GRANULOCYTES # BLD AUTO: 0.23 10*3/MM3 (ref 0–0.05)
IMM GRANULOCYTES NFR BLD AUTO: 1.6 % (ref 0–0.5)
LYMPHOCYTES # BLD AUTO: 1.83 10*3/MM3 (ref 0.7–3.1)
LYMPHOCYTES NFR BLD AUTO: 13.1 % (ref 19.6–45.3)
MCH RBC QN AUTO: 32.2 PG (ref 26.6–33)
MCHC RBC AUTO-ENTMCNC: 32.9 G/DL (ref 31.5–35.7)
MCV RBC AUTO: 97.6 FL (ref 79–97)
MONOCYTES # BLD AUTO: 1.31 10*3/MM3 (ref 0.1–0.9)
MONOCYTES NFR BLD AUTO: 9.4 % (ref 5–12)
NEUTROPHILS # BLD AUTO: 10.41 10*3/MM3 (ref 1.7–7)
NEUTROPHILS NFR BLD AUTO: 74.4 % (ref 42.7–76)
NRBC BLD AUTO-RTO: 0 /100 WBC (ref 0–0.2)
PLATELET # BLD AUTO: 155 10*3/MM3 (ref 140–450)
PMV BLD AUTO: 11.6 FL (ref 6–12)
RBC # BLD AUTO: 2.55 10*6/MM3 (ref 3.77–5.28)
WBC NRBC COR # BLD: 13.98 10*3/MM3 (ref 3.4–10.8)

## 2019-07-26 PROCEDURE — 85025 COMPLETE CBC W/AUTO DIFF WBC: CPT | Performed by: OBSTETRICS & GYNECOLOGY

## 2019-07-26 RX ORDER — LEVOTHYROXINE SODIUM 0.07 MG/1
75 TABLET ORAL
Status: COMPLETED | OUTPATIENT
Start: 2019-07-26 | End: 2019-07-28

## 2019-07-26 RX ORDER — LEVOTHYROXINE SODIUM 0.05 MG/1
50 TABLET ORAL
Status: DISCONTINUED | OUTPATIENT
Start: 2019-07-29 | End: 2019-07-29 | Stop reason: HOSPADM

## 2019-07-26 RX ADMIN — OXYCODONE HYDROCHLORIDE AND ACETAMINOPHEN 1 TABLET: 10; 325 TABLET ORAL at 21:35

## 2019-07-26 RX ADMIN — OXYCODONE HYDROCHLORIDE AND ACETAMINOPHEN 1 TABLET: 5; 325 TABLET ORAL at 09:03

## 2019-07-26 RX ADMIN — OXYCODONE HYDROCHLORIDE AND ACETAMINOPHEN 1 TABLET: 5; 325 TABLET ORAL at 13:06

## 2019-07-26 RX ADMIN — DOCUSATE SODIUM 100 MG: 100 CAPSULE, LIQUID FILLED ORAL at 21:35

## 2019-07-26 RX ADMIN — OXYCODONE HYDROCHLORIDE AND ACETAMINOPHEN 1 TABLET: 5; 325 TABLET ORAL at 17:12

## 2019-07-26 RX ADMIN — IBUPROFEN 800 MG: 800 TABLET, FILM COATED ORAL at 03:01

## 2019-07-26 RX ADMIN — IBUPROFEN 800 MG: 800 TABLET, FILM COATED ORAL at 13:06

## 2019-07-26 RX ADMIN — IBUPROFEN 800 MG: 800 TABLET, FILM COATED ORAL at 21:35

## 2019-07-26 RX ADMIN — DOCUSATE SODIUM 100 MG: 100 CAPSULE, LIQUID FILLED ORAL at 09:03

## 2019-07-27 RX ADMIN — DOCUSATE SODIUM 100 MG: 100 CAPSULE, LIQUID FILLED ORAL at 21:08

## 2019-07-27 RX ADMIN — SIMETHICONE CHEW TAB 80 MG 80 MG: 80 TABLET ORAL at 09:59

## 2019-07-27 RX ADMIN — OXYCODONE HYDROCHLORIDE AND ACETAMINOPHEN 1 TABLET: 5; 325 TABLET ORAL at 22:11

## 2019-07-27 RX ADMIN — IBUPROFEN 800 MG: 800 TABLET, FILM COATED ORAL at 05:33

## 2019-07-27 RX ADMIN — OXYCODONE HYDROCHLORIDE AND ACETAMINOPHEN 1 TABLET: 5; 325 TABLET ORAL at 13:58

## 2019-07-27 RX ADMIN — IBUPROFEN 800 MG: 800 TABLET, FILM COATED ORAL at 22:11

## 2019-07-27 RX ADMIN — IBUPROFEN 800 MG: 800 TABLET, FILM COATED ORAL at 13:58

## 2019-07-27 RX ADMIN — LEVOTHYROXINE SODIUM 75 MCG: 75 TABLET ORAL at 05:33

## 2019-07-27 RX ADMIN — OXYCODONE HYDROCHLORIDE AND ACETAMINOPHEN 1 TABLET: 5; 325 TABLET ORAL at 18:04

## 2019-07-27 RX ADMIN — SIMETHICONE CHEW TAB 80 MG 80 MG: 80 TABLET ORAL at 21:08

## 2019-07-27 RX ADMIN — OXYCODONE HYDROCHLORIDE AND ACETAMINOPHEN 1 TABLET: 5; 325 TABLET ORAL at 09:59

## 2019-07-27 RX ADMIN — DOCUSATE SODIUM 100 MG: 100 CAPSULE, LIQUID FILLED ORAL at 09:59

## 2019-07-27 RX ADMIN — OXYCODONE HYDROCHLORIDE AND ACETAMINOPHEN 1 TABLET: 5; 325 TABLET ORAL at 05:34

## 2019-07-28 RX ADMIN — IBUPROFEN 800 MG: 800 TABLET, FILM COATED ORAL at 09:05

## 2019-07-28 RX ADMIN — LEVOTHYROXINE SODIUM 75 MCG: 75 TABLET ORAL at 06:01

## 2019-07-28 RX ADMIN — IBUPROFEN 800 MG: 800 TABLET, FILM COATED ORAL at 17:25

## 2019-07-28 RX ADMIN — OXYCODONE HYDROCHLORIDE AND ACETAMINOPHEN 1 TABLET: 5; 325 TABLET ORAL at 13:39

## 2019-07-28 RX ADMIN — SIMETHICONE CHEW TAB 80 MG 80 MG: 80 TABLET ORAL at 09:06

## 2019-07-28 RX ADMIN — OXYCODONE HYDROCHLORIDE AND ACETAMINOPHEN 1 TABLET: 5; 325 TABLET ORAL at 09:05

## 2019-07-28 RX ADMIN — OXYCODONE HYDROCHLORIDE AND ACETAMINOPHEN 1 TABLET: 5; 325 TABLET ORAL at 17:25

## 2019-07-28 RX ADMIN — DOCUSATE SODIUM 100 MG: 100 CAPSULE, LIQUID FILLED ORAL at 21:34

## 2019-07-28 RX ADMIN — DOCUSATE SODIUM 100 MG: 100 CAPSULE, LIQUID FILLED ORAL at 09:05

## 2019-07-28 RX ADMIN — OXYCODONE HYDROCHLORIDE AND ACETAMINOPHEN 1 TABLET: 5; 325 TABLET ORAL at 21:34

## 2019-07-29 VITALS
SYSTOLIC BLOOD PRESSURE: 119 MMHG | DIASTOLIC BLOOD PRESSURE: 77 MMHG | TEMPERATURE: 98 F | WEIGHT: 180 LBS | HEIGHT: 65 IN | HEART RATE: 70 BPM | BODY MASS INDEX: 29.99 KG/M2 | OXYGEN SATURATION: 99 % | RESPIRATION RATE: 18 BRPM

## 2019-07-29 RX ORDER — OXYCODONE HYDROCHLORIDE AND ACETAMINOPHEN 5; 325 MG/1; MG/1
2 TABLET ORAL EVERY 4 HOURS PRN
Qty: 30 TABLET | Refills: 0 | Status: SHIPPED | OUTPATIENT
Start: 2019-07-29 | End: 2019-08-01

## 2019-07-29 RX ORDER — IBUPROFEN 800 MG/1
800 TABLET ORAL EVERY 8 HOURS PRN
Qty: 60 TABLET | Refills: 0 | Status: SHIPPED | OUTPATIENT
Start: 2019-07-29 | End: 2022-06-20

## 2019-07-29 RX ADMIN — SIMETHICONE CHEW TAB 80 MG 80 MG: 80 TABLET ORAL at 08:52

## 2019-07-29 RX ADMIN — DOCUSATE SODIUM 100 MG: 100 CAPSULE, LIQUID FILLED ORAL at 08:52

## 2019-07-29 RX ADMIN — LEVOTHYROXINE SODIUM 50 MCG: 50 TABLET ORAL at 06:08

## 2019-07-29 RX ADMIN — IBUPROFEN 800 MG: 800 TABLET, FILM COATED ORAL at 04:00

## 2019-07-29 RX ADMIN — OXYCODONE HYDROCHLORIDE AND ACETAMINOPHEN 1 TABLET: 5; 325 TABLET ORAL at 04:00

## 2019-07-29 RX ADMIN — OXYCODONE HYDROCHLORIDE AND ACETAMINOPHEN 1 TABLET: 5; 325 TABLET ORAL at 08:52

## 2019-09-12 LAB
25(OH)D3+25(OH)D2 SERPL-MCNC: 33.1 NG/ML (ref 30–100)
ALBUMIN SERPL-MCNC: 4.5 G/DL (ref 3.5–5.2)
ALBUMIN/GLOB SERPL: 2 G/DL
ALP SERPL-CCNC: 136 U/L (ref 39–117)
ALT SERPL-CCNC: 14 U/L (ref 1–33)
AST SERPL-CCNC: 15 U/L (ref 1–32)
BASOPHILS # BLD AUTO: 0.09 10*3/MM3 (ref 0–0.2)
BASOPHILS NFR BLD AUTO: 0.9 % (ref 0–1.5)
BILIRUB SERPL-MCNC: 0.2 MG/DL (ref 0.2–1.2)
BUN SERPL-MCNC: 19 MG/DL (ref 6–20)
BUN/CREAT SERPL: 19 (ref 7–25)
CALCIUM SERPL-MCNC: 10 MG/DL (ref 8.6–10.5)
CHLORIDE SERPL-SCNC: 101 MMOL/L (ref 98–107)
CHOLEST SERPL-MCNC: 221 MG/DL (ref 0–200)
CO2 SERPL-SCNC: 25.2 MMOL/L (ref 22–29)
CREAT SERPL-MCNC: 1 MG/DL (ref 0.57–1)
EOSINOPHIL # BLD AUTO: 0.54 10*3/MM3 (ref 0–0.4)
EOSINOPHIL NFR BLD AUTO: 5.3 % (ref 0.3–6.2)
ERYTHROCYTE [DISTWIDTH] IN BLOOD BY AUTOMATED COUNT: 12.5 % (ref 12.3–15.4)
FERRITIN SERPL-MCNC: 47 NG/ML (ref 13–150)
GLOBULIN SER CALC-MCNC: 2.2 GM/DL
GLUCOSE SERPL-MCNC: 88 MG/DL (ref 65–99)
HCT VFR BLD AUTO: 41.7 % (ref 34–46.6)
HDLC SERPL-MCNC: 79 MG/DL (ref 40–60)
HGB BLD-MCNC: 13 G/DL (ref 12–15.9)
IMM GRANULOCYTES # BLD AUTO: 0.1 10*3/MM3 (ref 0–0.05)
IMM GRANULOCYTES NFR BLD AUTO: 1 % (ref 0–0.5)
LDLC SERPL CALC-MCNC: 121 MG/DL (ref 0–100)
LDLC/HDLC SERPL: 1.54 {RATIO}
LYMPHOCYTES # BLD AUTO: 1.79 10*3/MM3 (ref 0.7–3.1)
LYMPHOCYTES NFR BLD AUTO: 17.5 % (ref 19.6–45.3)
MCH RBC QN AUTO: 29.6 PG (ref 26.6–33)
MCHC RBC AUTO-ENTMCNC: 31.2 G/DL (ref 31.5–35.7)
MCV RBC AUTO: 95 FL (ref 79–97)
MONOCYTES # BLD AUTO: 0.71 10*3/MM3 (ref 0.1–0.9)
MONOCYTES NFR BLD AUTO: 6.9 % (ref 5–12)
NEUTROPHILS # BLD AUTO: 7.02 10*3/MM3 (ref 1.7–7)
NEUTROPHILS NFR BLD AUTO: 68.4 % (ref 42.7–76)
NRBC BLD AUTO-RTO: 0 /100 WBC (ref 0–0.2)
PLATELET # BLD AUTO: 336 10*3/MM3 (ref 140–450)
POTASSIUM SERPL-SCNC: 4.6 MMOL/L (ref 3.5–5.2)
PROT SERPL-MCNC: 6.7 G/DL (ref 6–8.5)
RBC # BLD AUTO: 4.39 10*6/MM3 (ref 3.77–5.28)
SODIUM SERPL-SCNC: 141 MMOL/L (ref 136–145)
TRIGL SERPL-MCNC: 103 MG/DL (ref 0–150)
TSH SERPL DL<=0.005 MIU/L-ACNC: 1.07 UIU/ML (ref 0.27–4.2)
VLDLC SERPL CALC-MCNC: 20.6 MG/DL
WBC # BLD AUTO: 10.25 10*3/MM3 (ref 3.4–10.8)

## 2019-09-17 ENCOUNTER — OFFICE VISIT (OUTPATIENT)
Dept: INTERNAL MEDICINE | Facility: CLINIC | Age: 34
End: 2019-09-17

## 2019-09-17 VITALS
DIASTOLIC BLOOD PRESSURE: 62 MMHG | HEART RATE: 71 BPM | OXYGEN SATURATION: 99 % | SYSTOLIC BLOOD PRESSURE: 110 MMHG | TEMPERATURE: 97.8 F | HEIGHT: 65 IN | WEIGHT: 150.1 LBS | BODY MASS INDEX: 25.01 KG/M2

## 2019-09-17 DIAGNOSIS — Z00.00 HEALTH CARE MAINTENANCE: Primary | ICD-10-CM

## 2019-09-17 DIAGNOSIS — E03.9 ACQUIRED HYPOTHYROIDISM: ICD-10-CM

## 2019-09-17 PROBLEM — O28.0 ABNORMAL MATERNAL SERUM SCREENING TEST: Status: ACTIVE | Noted: 2019-03-25

## 2019-09-17 PROBLEM — IMO0002 DIZYGOTIC TWINS: Status: ACTIVE | Noted: 2019-03-25

## 2019-09-17 PROCEDURE — 90674 CCIIV4 VAC NO PRSV 0.5 ML IM: CPT | Performed by: NURSE PRACTITIONER

## 2019-09-17 PROCEDURE — 90471 IMMUNIZATION ADMIN: CPT | Performed by: NURSE PRACTITIONER

## 2019-09-17 PROCEDURE — 99395 PREV VISIT EST AGE 18-39: CPT | Performed by: NURSE PRACTITIONER

## 2019-09-17 RX ORDER — NORETHINDRONE AND ETHINYL ESTRADIOL 1 MG-35MCG
1 KIT ORAL DAILY
Refills: 5 | COMMUNITY
Start: 2019-09-10

## 2019-09-17 NOTE — PROGRESS NOTES
Subjective   Cecille Devi is a 34 y.o. female.     History of Present Illness   The patient is here today for CPE and lab work F/U. She is feeling well. Babies are doing pretty well with sleeping.     Hypothyroidism- she was just put on thyroid hormone due to fertility, she would like to try off of this.     Right breast sore, drying up from lactating. Just finished antibiotic for mastitis left breast.     GYN- UTD, pap nl per patient. Had unremarkable pregnancy and  delivery at 37 weeks.     Sons are Petar and cO.  Oc will probably need heart sx in November.   The following portions of the patient's history were reviewed and updated as appropriate: allergies, current medications, past family history, past medical history, past social history, past surgical history and problem list.    Review of Systems   Constitutional: Negative for chills, fatigue and fever.   HENT: Negative for ear pain, rhinorrhea and sore throat.    Eyes: Negative.    Respiratory: Negative for cough and shortness of breath.    Cardiovascular: Negative.    Gastrointestinal: Negative.    Endocrine: Negative for cold intolerance and heat intolerance.   Genitourinary: Negative for breast discharge, breast lump, breast pain, difficulty urinating, dyspareunia, dysuria, flank pain, frequency, genital sores, hematuria, pelvic pain and urgency.   Musculoskeletal: Negative.    Skin: Negative.    Allergic/Immunologic: Negative for environmental allergies and food allergies.   Neurological: Negative.    Hematological: Negative.    Psychiatric/Behavioral: Negative for dysphoric mood and suicidal ideas. The patient is not nervous/anxious.        Objective   Physical Exam   Constitutional: She is oriented to person, place, and time. Vital signs are normal. She appears well-developed and well-nourished. She is cooperative.   Body mass index is 24.98 kg/m².     HENT:   Right Ear: Hearing, tympanic membrane, external ear and ear canal  normal.   Left Ear: Hearing, tympanic membrane, external ear and ear canal normal.   Nose: Nose normal.   Mouth/Throat: Uvula is midline, oropharynx is clear and moist and mucous membranes are normal.   Eyes: Conjunctivae, EOM and lids are normal. Pupils are equal, round, and reactive to light.   Neck: Trachea normal and normal range of motion. Carotid bruit is not present. No thyroid mass and no thyromegaly present.   Cardiovascular: Normal rate, regular rhythm, normal heart sounds and normal pulses.   Pulmonary/Chest: Effort normal and breath sounds normal.   Abdominal: Soft. Normal appearance, normal aorta and bowel sounds are normal. There is no hepatosplenomegaly. There is no tenderness.   Musculoskeletal: Normal range of motion.   Lymphadenopathy:     She has no cervical adenopathy.     She has no axillary adenopathy. No inguinal adenopathy noted on the right or left side.        Right: No inguinal and no supraclavicular adenopathy present.        Left: No inguinal and no supraclavicular adenopathy present.   Neurological: She is alert and oriented to person, place, and time. She has normal strength. No cranial nerve deficit or sensory deficit. She displays a negative Romberg sign. GCS eye subscore is 4. GCS verbal subscore is 5. GCS motor subscore is 6.   Reflex Scores:       Patellar reflexes are 2+ on the right side and 2+ on the left side.  Skin: Skin is warm, dry and intact.   Psychiatric: She has a normal mood and affect. Her speech is normal and behavior is normal. Judgment and thought content normal. Cognition and memory are normal.       Assessment/Plan   Cecille was seen today for annual exam.    Diagnoses and all orders for this visit:    Health care maintenance    Acquired hypothyroidism  -     TSH Rfx On Abnormal To Free T4; Future    Other orders  -     Flucelvax Quad=>4Years ([id number])                 1. HCM- work on healthy diet and try to walk and get rest  2. Hypothyroidism- ok to try off,  check labs in 6-8 weeks.     Flu vaccine- today  Returning to work at the end of October

## 2019-10-22 DIAGNOSIS — E03.9 ACQUIRED HYPOTHYROIDISM: ICD-10-CM

## 2019-10-23 LAB — TSH SERPL DL<=0.005 MIU/L-ACNC: 3.87 UIU/ML (ref 0.27–4.2)

## 2020-04-29 ENCOUNTER — TELEMEDICINE (OUTPATIENT)
Dept: INTERNAL MEDICINE | Facility: CLINIC | Age: 35
End: 2020-04-29

## 2020-04-29 VITALS — HEART RATE: 72 BPM

## 2020-04-29 DIAGNOSIS — R07.9 CHEST PAIN, UNSPECIFIED TYPE: ICD-10-CM

## 2020-04-29 DIAGNOSIS — F41.9 ANXIETY: Primary | ICD-10-CM

## 2020-04-29 PROCEDURE — 99214 OFFICE O/P EST MOD 30 MIN: CPT | Performed by: NURSE PRACTITIONER

## 2020-04-29 RX ORDER — ESCITALOPRAM OXALATE 10 MG/1
10 TABLET ORAL DAILY
Qty: 30 TABLET | Refills: 6 | Status: SHIPPED | OUTPATIENT
Start: 2020-04-29 | End: 2021-03-30

## 2020-04-29 NOTE — PATIENT INSTRUCTIONS
1. Anxiety- try lexapro 10 mg daily, re-eval in 4 weeks. Aware of SEs  2. Chest pain- if continues or becomes severe go to ER. Discussed EKG if symptoms continue. Ok for gentle stretching and tylenol or naproxen. Monitor posture. Would consider PT if this continues.

## 2020-04-29 NOTE — PROGRESS NOTES
"Subjective   Cecille Devi is a 35 y.o. female.     History of Present Illness    The patient is here today with c/o left sided chest pain. Started a while ago but worse the last few weeks. She has had this left sided chest pain since she was \"younger\". Feels tight. Often improved by slow deep breathes. Sometimes painful to get through the deep breathes. She is able to lift the boys with out problem.   All day Monday just felt tight, sometimes tender to palpation.   Stress is high. She is trying to stay at home. Limited interactions. Work is also stressful, started a new job at the beginning of the new year.      She does not have chest pain with taking steps.     \"The older I get the more anxiety I have.\"    Oc did have heart surgery in December. This was successful.     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   The following portions of the patient's history were reviewed and updated as appropriate: allergies, current medications, past family history, past medical history, past social history, past surgical history and problem list.    Review of Systems   Constitutional: Negative.    Respiratory: Positive for chest tightness. Negative for cough, shortness of breath and wheezing.    Cardiovascular: Positive for chest pain (left sided, possibly muscular). Negative for palpitations and leg swelling.   Gastrointestinal: Positive for nausea (occasionally, not lately). Negative for GERD and indigestion.   Psychiatric/Behavioral: Negative for suicidal ideas and depressed mood. The patient is nervous/anxious.        Objective   Physical Exam   Constitutional: She appears well-developed and well-nourished.   Pulmonary/Chest: Effort normal.   Psychiatric: Her speech is normal and behavior is normal. Judgment and thought content normal. Her mood appears anxious. Cognition and memory are normal.       Vitals:    04/29/20 1054   Pulse: 72     There is no height or weight on file to calculate BMI.      Assessment/Plan   Cecille was seen " today for chest pain.    Diagnoses and all orders for this visit:    Anxiety  -     escitalopram (Lexapro) 10 MG tablet; Take 1 tablet by mouth Daily.    Chest pain, unspecified type          Visit completed via video.        1. Anxiety- try lexapro 10 mg daily, re-eval in 4 weeks. Aware of SEs  2. Chest pain- if continues or becomes severe go to ER. Discussed EKG if symptoms continue. Ok for gentle stretching and tylenol or naproxen. Monitor posture. Would consider PT if this continues. Discussed family hx, does have cardiac ds in paternal uncles, none in father.

## 2020-05-12 ENCOUNTER — OFFICE VISIT (OUTPATIENT)
Dept: INTERNAL MEDICINE | Facility: CLINIC | Age: 35
End: 2020-05-12

## 2020-05-12 VITALS
SYSTOLIC BLOOD PRESSURE: 110 MMHG | HEART RATE: 69 BPM | HEIGHT: 65 IN | BODY MASS INDEX: 24.16 KG/M2 | TEMPERATURE: 98 F | DIASTOLIC BLOOD PRESSURE: 70 MMHG | OXYGEN SATURATION: 99 % | WEIGHT: 145 LBS

## 2020-05-12 DIAGNOSIS — R07.9 CHEST PAIN, UNSPECIFIED TYPE: Primary | ICD-10-CM

## 2020-05-12 PROBLEM — Z34.90 PREGNANCY: Status: RESOLVED | Noted: 2019-07-12 | Resolved: 2020-05-12

## 2020-05-12 PROCEDURE — 93000 ELECTROCARDIOGRAM COMPLETE: CPT | Performed by: NURSE PRACTITIONER

## 2020-05-12 PROCEDURE — 99213 OFFICE O/P EST LOW 20 MIN: CPT | Performed by: NURSE PRACTITIONER

## 2020-05-12 NOTE — PROGRESS NOTES
"Subjective   Cecille Devi is a 35 y.o. female.     History of Present Illness    The patient is here today with c/o continued left chest pain. Started again Thursday evening. She was eating pizza and wine. She has not tried any pepcid or prilosec. \"I have had stomach issues since college when I really started drinking.\" She currently does not drink very much, maybe 1 glass a week. Does have an intolerance to marinara sauce.     Pt not exercising but when she runs up and down the steps does not have chest pain or SOA.     Anxiety- she is taking ashwaganda root with some relief. No panic attacks. Does not feel it contributes to her abd issues.     She also notes no libido.     The following portions of the patient's history were reviewed and updated as appropriate: allergies, current medications, past family history, past medical history, past social history, past surgical history and problem list.    Review of Systems   Constitutional: Negative.    Respiratory: Positive for chest tightness (left sided). Negative for cough, shortness of breath, wheezing and stridor.    Cardiovascular: Positive for chest pain. Negative for palpitations and leg swelling.   Gastrointestinal: Positive for abdominal pain (epigastric ) and nausea. Negative for abdominal distention, anal bleeding, blood in stool, constipation, diarrhea, vomiting, GERD and indigestion.       Objective   Physical Exam   Constitutional: She appears well-developed and well-nourished.   Neck: Normal range of motion. Neck supple. No thyromegaly present.   Cardiovascular: Normal rate, regular rhythm, normal heart sounds and intact distal pulses.   Pulmonary/Chest: Effort normal and breath sounds normal. She exhibits no tenderness, no edema and no swelling.   Abdominal: Soft. Normal appearance, normal aorta and bowel sounds are normal. There is tenderness (slight) in the right upper quadrant. A hernia (possible, mild, non tender, left mid abd near umbilicus) is " present.       Lymphadenopathy:     She has no cervical adenopathy.   Skin: Skin is warm and dry.   Psychiatric: She has a normal mood and affect. Her behavior is normal. Judgment and thought content normal.       Vitals:    05/12/20 0747   BP: 110/70   Pulse: 69   Temp: 98 °F (36.7 °C)   SpO2: 99%     Body mass index is 24.13 kg/m².      Assessment/Plan   Cecille was seen today for chest pain.    Diagnoses and all orders for this visit:    Chest pain, unspecified type  -     TSH Rfx On Abnormal To Free T4  -     Comprehensive Metabolic Panel  -     ECG 12 Lead        Chest pain- NSR, no baseline for comparison         1. Chest pain- EKG WNL, try to treat reflux with prilosec 20 mg daily, avoid acid inducing foods, continue to work on anxiety as tolerated  If symptoms continue would consider GB and or GI eval  Pt defers cardiology referral  Check TSH and CMP today  Discuss libido with GYN encouraged stress reduction.

## 2020-05-12 NOTE — PATIENT INSTRUCTIONS
Food Choices for Gastroesophageal Reflux Disease, Adult  When you have gastroesophageal reflux disease (GERD), the foods you eat and your eating habits are very important. Choosing the right foods can help ease your discomfort. Think about working with a nutrition specialist (dietitian) to help you make good choices.  What are tips for following this plan?    Meals  · Choose healthy foods that are low in fat, such as fruits, vegetables, whole grains, low-fat dairy products, and lean meat, fish, and poultry.  · Eat small meals often instead of 3 large meals a day. Eat your meals slowly, and in a place where you are relaxed. Avoid bending over or lying down until 2-3 hours after eating.  · Avoid eating meals 2-3 hours before bed.  · Avoid drinking a lot of liquid with meals.  · Cook foods using methods other than frying. Bake, grill, or broil food instead.  · Avoid or limit:  ? Chocolate.  ? Peppermint or spearmint.  ? Alcohol.  ? Pepper.  ? Black and decaffeinated coffee.  ? Black and decaffeinated tea.  ? Bubbly (carbonated) soft drinks.  ? Caffeinated energy drinks and soft drinks.  · Limit high-fat foods such as:  ? Fatty meat or fried foods.  ? Whole milk, cream, butter, or ice cream.  ? Nuts and nut butters.  ? Pastries, donuts, and sweets made with butter or shortening.  · Avoid foods that cause symptoms. These foods may be different for everyone. Common foods that cause symptoms include:  ? Tomatoes.  ? Oranges, dede, and limes.  ? Peppers.  ? Spicy food.  ? Onions and garlic.  ? Vinegar.  Lifestyle  · Maintain a healthy weight. Ask your doctor what weight is healthy for you. If you need to lose weight, work with your doctor to do so safely.  · Exercise for at least 30 minutes for 5 or more days each week, or as told by your doctor.  · Wear loose-fitting clothes.  · Do not smoke. If you need help quitting, ask your doctor.  · Sleep with the head of your bed higher than your feet. Use a wedge under the  mattress or blocks under the bed frame to raise the head of the bed.  Summary  · When you have gastroesophageal reflux disease (GERD), food and lifestyle choices are very important in easing your symptoms.  · Eat small meals often instead of 3 large meals a day. Eat your meals slowly, and in a place where you are relaxed.  · Limit high-fat foods such as fatty meat or fried foods.  · Avoid bending over or lying down until 2-3 hours after eating.  · Avoid peppermint and spearmint, caffeine, alcohol, and chocolate.  This information is not intended to replace advice given to you by your health care provider. Make sure you discuss any questions you have with your health care provider.  Document Released: 06/18/2013 Document Revised: 01/23/2018 Document Reviewed: 01/23/2018  [employer] Patient Education © 2020 [employer]

## 2020-05-13 LAB
ALBUMIN SERPL-MCNC: 4.8 G/DL (ref 3.5–5.2)
ALBUMIN/GLOB SERPL: 1.8 G/DL
ALP SERPL-CCNC: 72 U/L (ref 39–117)
ALT SERPL-CCNC: 9 U/L (ref 1–33)
AST SERPL-CCNC: 10 U/L (ref 1–32)
BILIRUB SERPL-MCNC: 0.3 MG/DL (ref 0.2–1.2)
BUN SERPL-MCNC: 15 MG/DL (ref 6–20)
BUN/CREAT SERPL: 16.5 (ref 7–25)
CALCIUM SERPL-MCNC: 9.5 MG/DL (ref 8.6–10.5)
CHLORIDE SERPL-SCNC: 102 MMOL/L (ref 98–107)
CO2 SERPL-SCNC: 25.6 MMOL/L (ref 22–29)
CREAT SERPL-MCNC: 0.91 MG/DL (ref 0.57–1)
GLOBULIN SER CALC-MCNC: 2.6 GM/DL
GLUCOSE SERPL-MCNC: 88 MG/DL (ref 65–99)
POTASSIUM SERPL-SCNC: 4.8 MMOL/L (ref 3.5–5.2)
PROT SERPL-MCNC: 7.4 G/DL (ref 6–8.5)
SODIUM SERPL-SCNC: 140 MMOL/L (ref 136–145)
TSH SERPL DL<=0.005 MIU/L-ACNC: 2.73 UIU/ML (ref 0.27–4.2)

## 2020-05-30 ENCOUNTER — HOSPITAL ENCOUNTER (EMERGENCY)
Facility: HOSPITAL | Age: 35
Discharge: HOME | End: 2020-05-31
Attending: EMERGENCY MEDICINE | Admitting: EMERGENCY MEDICINE

## 2020-05-30 ENCOUNTER — APPOINTMENT (OUTPATIENT)
Dept: CT IMAGING | Facility: HOSPITAL | Age: 35
End: 2020-05-30

## 2020-05-30 DIAGNOSIS — R07.89 MUSCULOSKELETAL CHEST PAIN: Primary | ICD-10-CM

## 2020-05-30 LAB
ALBUMIN SERPL-MCNC: 4.3 G/DL (ref 3.5–5.2)
ALBUMIN/GLOB SERPL: 1.7 G/DL
ALP SERPL-CCNC: 50 U/L (ref 39–117)
ALT SERPL W P-5'-P-CCNC: 10 U/L (ref 1–33)
ANION GAP SERPL CALCULATED.3IONS-SCNC: 9.2 MMOL/L (ref 5–15)
AST SERPL-CCNC: 11 U/L (ref 1–32)
BASOPHILS # BLD AUTO: 0.08 10*3/MM3 (ref 0–0.2)
BASOPHILS NFR BLD AUTO: 1.1 % (ref 0–1.5)
BILIRUB SERPL-MCNC: 0.3 MG/DL (ref 0.2–1.2)
BUN BLD-MCNC: 17 MG/DL (ref 6–20)
BUN/CREAT SERPL: 12.4 (ref 7–25)
CALCIUM SPEC-SCNC: 9.3 MG/DL (ref 8.6–10.5)
CHLORIDE SERPL-SCNC: 102 MMOL/L (ref 98–107)
CO2 SERPL-SCNC: 23.8 MMOL/L (ref 22–29)
CREAT BLD-MCNC: 1.37 MG/DL (ref 0.57–1)
DEPRECATED RDW RBC AUTO: 43.7 FL (ref 37–54)
EOSINOPHIL # BLD AUTO: 0.27 10*3/MM3 (ref 0–0.4)
EOSINOPHIL NFR BLD AUTO: 3.8 % (ref 0.3–6.2)
ERYTHROCYTE [DISTWIDTH] IN BLOOD BY AUTOMATED COUNT: 13 % (ref 12.3–15.4)
GFR SERPL CREATININE-BSD FRML MDRD: 44 ML/MIN/1.73
GLOBULIN UR ELPH-MCNC: 2.6 GM/DL
GLUCOSE BLD-MCNC: 103 MG/DL (ref 65–99)
HCG SERPL QL: NEGATIVE
HCT VFR BLD AUTO: 36.4 % (ref 34–46.6)
HGB BLD-MCNC: 12 G/DL (ref 12–15.9)
IMM GRANULOCYTES # BLD AUTO: 0.02 10*3/MM3 (ref 0–0.05)
IMM GRANULOCYTES NFR BLD AUTO: 0.3 % (ref 0–0.5)
INR PPP: 1.06 (ref 0.9–1.1)
LIPASE SERPL-CCNC: 38 U/L (ref 13–60)
LYMPHOCYTES # BLD AUTO: 2.02 10*3/MM3 (ref 0.7–3.1)
LYMPHOCYTES NFR BLD AUTO: 28.1 % (ref 19.6–45.3)
MCH RBC QN AUTO: 30.3 PG (ref 26.6–33)
MCHC RBC AUTO-ENTMCNC: 33 G/DL (ref 31.5–35.7)
MCV RBC AUTO: 91.9 FL (ref 79–97)
MONOCYTES # BLD AUTO: 0.6 10*3/MM3 (ref 0.1–0.9)
MONOCYTES NFR BLD AUTO: 8.3 % (ref 5–12)
NEUTROPHILS # BLD AUTO: 4.2 10*3/MM3 (ref 1.7–7)
NEUTROPHILS NFR BLD AUTO: 58.4 % (ref 42.7–76)
NRBC BLD AUTO-RTO: 0 /100 WBC (ref 0–0.2)
PLATELET # BLD AUTO: 303 10*3/MM3 (ref 140–450)
PMV BLD AUTO: 10.7 FL (ref 6–12)
POTASSIUM BLD-SCNC: 4 MMOL/L (ref 3.5–5.2)
PROT SERPL-MCNC: 6.9 G/DL (ref 6–8.5)
PROTHROMBIN TIME: 13.5 SECONDS (ref 11.7–14.2)
RBC # BLD AUTO: 3.96 10*6/MM3 (ref 3.77–5.28)
SODIUM BLD-SCNC: 135 MMOL/L (ref 136–145)
TROPONIN T SERPL-MCNC: <0.01 NG/ML (ref 0–0.03)
WBC NRBC COR # BLD: 7.19 10*3/MM3 (ref 3.4–10.8)

## 2020-05-30 PROCEDURE — 74177 CT ABD & PELVIS W/CONTRAST: CPT

## 2020-05-30 PROCEDURE — 93010 ELECTROCARDIOGRAM REPORT: CPT | Performed by: INTERNAL MEDICINE

## 2020-05-30 PROCEDURE — 0 IOPAMIDOL PER 1 ML: Performed by: EMERGENCY MEDICINE

## 2020-05-30 PROCEDURE — 83690 ASSAY OF LIPASE: CPT | Performed by: EMERGENCY MEDICINE

## 2020-05-30 PROCEDURE — 99283 EMERGENCY DEPT VISIT LOW MDM: CPT

## 2020-05-30 PROCEDURE — 81003 URINALYSIS AUTO W/O SCOPE: CPT | Performed by: EMERGENCY MEDICINE

## 2020-05-30 PROCEDURE — 71275 CT ANGIOGRAPHY CHEST: CPT

## 2020-05-30 PROCEDURE — 84703 CHORIONIC GONADOTROPIN ASSAY: CPT | Performed by: EMERGENCY MEDICINE

## 2020-05-30 PROCEDURE — 25010000002 HYDROMORPHONE PER 4 MG: Performed by: EMERGENCY MEDICINE

## 2020-05-30 PROCEDURE — 25010000002 ONDANSETRON PER 1 MG: Performed by: EMERGENCY MEDICINE

## 2020-05-30 PROCEDURE — 85025 COMPLETE CBC W/AUTO DIFF WBC: CPT | Performed by: EMERGENCY MEDICINE

## 2020-05-30 PROCEDURE — 85610 PROTHROMBIN TIME: CPT | Performed by: EMERGENCY MEDICINE

## 2020-05-30 PROCEDURE — 93005 ELECTROCARDIOGRAM TRACING: CPT | Performed by: EMERGENCY MEDICINE

## 2020-05-30 PROCEDURE — 84484 ASSAY OF TROPONIN QUANT: CPT | Performed by: EMERGENCY MEDICINE

## 2020-05-30 PROCEDURE — 80053 COMPREHEN METABOLIC PANEL: CPT | Performed by: EMERGENCY MEDICINE

## 2020-05-30 PROCEDURE — 96375 TX/PRO/DX INJ NEW DRUG ADDON: CPT

## 2020-05-30 PROCEDURE — 96374 THER/PROPH/DIAG INJ IV PUSH: CPT

## 2020-05-30 RX ORDER — ONDANSETRON 2 MG/ML
4 INJECTION INTRAMUSCULAR; INTRAVENOUS ONCE
Status: COMPLETED | OUTPATIENT
Start: 2020-05-30 | End: 2020-05-30

## 2020-05-30 RX ORDER — HYDROMORPHONE HYDROCHLORIDE 1 MG/ML
0.5 INJECTION, SOLUTION INTRAMUSCULAR; INTRAVENOUS; SUBCUTANEOUS ONCE
Status: COMPLETED | OUTPATIENT
Start: 2020-05-30 | End: 2020-05-30

## 2020-05-30 RX ADMIN — ONDANSETRON 4 MG: 2 INJECTION INTRAMUSCULAR; INTRAVENOUS at 22:16

## 2020-05-30 RX ADMIN — IOPAMIDOL 95 ML: 755 INJECTION, SOLUTION INTRAVENOUS at 23:27

## 2020-05-30 RX ADMIN — HYDROMORPHONE HYDROCHLORIDE 0.5 MG: 1 INJECTION, SOLUTION INTRAMUSCULAR; INTRAVENOUS; SUBCUTANEOUS at 22:16

## 2020-05-31 VITALS
HEIGHT: 65 IN | DIASTOLIC BLOOD PRESSURE: 77 MMHG | TEMPERATURE: 97.6 F | HEART RATE: 68 BPM | SYSTOLIC BLOOD PRESSURE: 114 MMHG | WEIGHT: 139 LBS | OXYGEN SATURATION: 100 % | BODY MASS INDEX: 23.16 KG/M2 | RESPIRATION RATE: 16 BRPM

## 2020-05-31 LAB
BILIRUB UR QL STRIP: NEGATIVE
CLARITY UR: CLEAR
COLOR UR: YELLOW
GLUCOSE UR STRIP-MCNC: NEGATIVE MG/DL
HGB UR QL STRIP.AUTO: NEGATIVE
KETONES UR QL STRIP: NEGATIVE
LEUKOCYTE ESTERASE UR QL STRIP.AUTO: NEGATIVE
NITRITE UR QL STRIP: NEGATIVE
PH UR STRIP.AUTO: 5.5 [PH] (ref 5–8)
PROT UR QL STRIP: NEGATIVE
SP GR UR STRIP: 1.02 (ref 1–1.03)
UROBILINOGEN UR QL STRIP: NORMAL

## 2020-05-31 RX ORDER — HYDROCODONE BITARTRATE AND ACETAMINOPHEN 5; 325 MG/1; MG/1
1 TABLET ORAL 4 TIMES DAILY PRN
Qty: 10 TABLET | Refills: 0 | Status: SHIPPED | OUTPATIENT
Start: 2020-05-31 | End: 2021-03-30

## 2020-05-31 RX ORDER — CYCLOBENZAPRINE HCL 10 MG
10 TABLET ORAL 3 TIMES DAILY PRN
Qty: 15 TABLET | Refills: 0 | Status: SHIPPED | OUTPATIENT
Start: 2020-05-31 | End: 2021-03-30

## 2020-05-31 RX ORDER — CYCLOBENZAPRINE HCL 10 MG
10 TABLET ORAL ONCE
Status: COMPLETED | OUTPATIENT
Start: 2020-05-31 | End: 2020-05-31

## 2020-05-31 RX ADMIN — CYCLOBENZAPRINE 10 MG: 10 TABLET, FILM COATED ORAL at 00:40

## 2020-06-01 DIAGNOSIS — K30 INDIGESTION: Primary | ICD-10-CM

## 2020-06-01 DIAGNOSIS — R07.9 CHEST PAIN, UNSPECIFIED TYPE: ICD-10-CM

## 2020-06-01 RX ORDER — OMEPRAZOLE 40 MG/1
40 CAPSULE, DELAYED RELEASE ORAL DAILY
Qty: 90 CAPSULE | Refills: 0 | Status: SHIPPED | OUTPATIENT
Start: 2020-06-01 | End: 2021-03-30

## 2020-09-15 ENCOUNTER — TELEPHONE (OUTPATIENT)
Dept: INTERNAL MEDICINE | Facility: CLINIC | Age: 35
End: 2020-09-15

## 2020-09-15 DIAGNOSIS — Z00.00 HEALTH CARE MAINTENANCE: Primary | ICD-10-CM

## 2020-09-15 DIAGNOSIS — E03.9 ACQUIRED HYPOTHYROIDISM: ICD-10-CM

## 2020-09-15 NOTE — TELEPHONE ENCOUNTER
Labs ordered.    ----- Message from SHARRI Griffiths sent at 9/15/2020  2:49 PM EDT -----  CMP. LP, TSH, Hep C antibody if needed  ----- Message -----  From: Abida Little  Sent: 9/15/2020  12:18 PM EDT  To: SHARRI Griffiths    PLEASE ADVISE FOR LABS.

## 2021-03-24 LAB
ALBUMIN SERPL-MCNC: 4.7 G/DL (ref 3.5–5.2)
ALBUMIN/GLOB SERPL: 1.9 G/DL
ALP SERPL-CCNC: 75 U/L (ref 39–117)
ALT SERPL-CCNC: 10 U/L (ref 1–33)
AST SERPL-CCNC: 13 U/L (ref 1–32)
BILIRUB SERPL-MCNC: 0.5 MG/DL (ref 0–1.2)
BUN SERPL-MCNC: 11 MG/DL (ref 6–20)
BUN/CREAT SERPL: 10.8 (ref 7–25)
CALCIUM SERPL-MCNC: 9.6 MG/DL (ref 8.6–10.5)
CHLORIDE SERPL-SCNC: 104 MMOL/L (ref 98–107)
CHOLEST SERPL-MCNC: 197 MG/DL (ref 0–200)
CHOLEST/HDLC SERPL: 3.28 {RATIO}
CO2 SERPL-SCNC: 27.2 MMOL/L (ref 22–29)
CREAT SERPL-MCNC: 1.02 MG/DL (ref 0.57–1)
GLOBULIN SER CALC-MCNC: 2.5 GM/DL
GLUCOSE SERPL-MCNC: 78 MG/DL (ref 65–99)
HCV AB S/CO SERPL IA: <0.1 S/CO RATIO (ref 0–0.9)
HDLC SERPL-MCNC: 60 MG/DL (ref 40–60)
LDLC SERPL CALC-MCNC: 124 MG/DL (ref 0–100)
POTASSIUM SERPL-SCNC: 4.5 MMOL/L (ref 3.5–5.2)
PROT SERPL-MCNC: 7.2 G/DL (ref 6–8.5)
SODIUM SERPL-SCNC: 144 MMOL/L (ref 136–145)
TRIGL SERPL-MCNC: 72 MG/DL (ref 0–150)
TSH SERPL DL<=0.005 MIU/L-ACNC: 2.34 UIU/ML (ref 0.27–4.2)
VLDLC SERPL CALC-MCNC: 13 MG/DL (ref 5–40)

## 2021-03-30 ENCOUNTER — OFFICE VISIT (OUTPATIENT)
Dept: INTERNAL MEDICINE | Facility: CLINIC | Age: 36
End: 2021-03-30

## 2021-03-30 VITALS
WEIGHT: 132 LBS | TEMPERATURE: 96.6 F | BODY MASS INDEX: 21.99 KG/M2 | SYSTOLIC BLOOD PRESSURE: 98 MMHG | HEIGHT: 65 IN | OXYGEN SATURATION: 99 % | DIASTOLIC BLOOD PRESSURE: 60 MMHG | HEART RATE: 61 BPM

## 2021-03-30 DIAGNOSIS — R79.89 ELEVATED SERUM CREATININE: ICD-10-CM

## 2021-03-30 DIAGNOSIS — K21.9 GASTROESOPHAGEAL REFLUX DISEASE, UNSPECIFIED WHETHER ESOPHAGITIS PRESENT: ICD-10-CM

## 2021-03-30 DIAGNOSIS — T78.1XXA GASTROINTESTINAL FOOD SENSITIVITY: ICD-10-CM

## 2021-03-30 DIAGNOSIS — Z00.00 HEALTH CARE MAINTENANCE: Primary | ICD-10-CM

## 2021-03-30 PROCEDURE — 99395 PREV VISIT EST AGE 18-39: CPT | Performed by: NURSE PRACTITIONER

## 2021-03-30 NOTE — PROGRESS NOTES
Subjective   Cecille Devi is a 36 y.o. female.     History of Present Illness   The patient is here today for CPE and lab work F/U. She is feeling well.   She has adjusted to her new job. She is taking ashwaganda root daily.   She does not eat out at lunch anymore, has lost wt due to this.     She reports extreme stomach pain with maranara. She does eat tomatoes and has not problem with them.     Sons are Petar and Oc 20, months.     The following portions of the patient's history were reviewed and updated as appropriate: allergies, current medications, past family history, past medical history, past social history, past surgical history and problem list.    Review of Systems   Constitutional: Negative for chills, fatigue and fever.   HENT: Negative for ear pain, rhinorrhea and sore throat.    Eyes: Negative.    Respiratory: Negative for cough and shortness of breath.    Cardiovascular: Negative.    Gastrointestinal: Negative.    Endocrine: Negative for cold intolerance and heat intolerance.   Genitourinary: Negative for breast discharge, breast lump, breast pain, difficulty urinating, dyspareunia, dysuria, flank pain, frequency, genital sores, hematuria, pelvic pain and urgency.   Musculoskeletal: Negative.    Skin: Negative.    Allergic/Immunologic: Negative for environmental allergies and food allergies.   Neurological: Positive for headache (occ).   Hematological: Negative.    Psychiatric/Behavioral: Negative for dysphoric mood and suicidal ideas. The patient is not nervous/anxious.        Objective   Physical Exam  Constitutional:       Appearance: Normal appearance. She is well-developed.      Comments: Body mass index is 21.97 kg/m².     HENT:      Right Ear: Hearing, tympanic membrane, ear canal and external ear normal.      Left Ear: Hearing, tympanic membrane, ear canal and external ear normal.      Nose: Nose normal.      Mouth/Throat:      Pharynx: Uvula midline.   Eyes:      General: Lids are  normal.      Conjunctiva/sclera: Conjunctivae normal.      Pupils: Pupils are equal, round, and reactive to light.   Neck:      Thyroid: No thyroid mass or thyromegaly.      Vascular: No carotid bruit.      Trachea: Trachea normal.   Cardiovascular:      Rate and Rhythm: Normal rate and regular rhythm.      Pulses: Normal pulses.      Heart sounds: Normal heart sounds.   Pulmonary:      Effort: Pulmonary effort is normal.      Breath sounds: Normal breath sounds.   Abdominal:      General: Bowel sounds are normal.      Palpations: Abdomen is soft.      Tenderness: There is no abdominal tenderness.   Musculoskeletal:         General: Normal range of motion.      Cervical back: Normal range of motion.   Lymphadenopathy:      Cervical: Cervical adenopathy present.      Right cervical: Superficial cervical adenopathy present.      Left cervical: Superficial cervical adenopathy present.      Upper Body:      Right upper body: No supraclavicular adenopathy.      Left upper body: No supraclavicular adenopathy.      Lower Body: No right inguinal adenopathy. No left inguinal adenopathy.      Comments: Shotty lymph nodes present    Skin:     General: Skin is warm and dry.   Neurological:      Mental Status: She is alert and oriented to person, place, and time.      GCS: GCS eye subscore is 4. GCS verbal subscore is 5. GCS motor subscore is 6.      Cranial Nerves: No cranial nerve deficit.      Sensory: No sensory deficit.      Deep Tendon Reflexes:      Reflex Scores:       Patellar reflexes are 2+ on the right side and 2+ on the left side.  Psychiatric:         Speech: Speech normal.         Behavior: Behavior normal. Behavior is cooperative.         Thought Content: Thought content normal.         Judgment: Judgment normal.         Vitals:    03/30/21 1416   BP: 98/60   Pulse: 61   Temp: 96.6 °F (35.9 °C)   SpO2: 99%     Body mass index is 21.97 kg/m².      Assessment/Plan   Diagnoses and all orders for this visit:    1.  Health care maintenance (Primary)    2. Gastrointestinal food sensitivity  -     Ambulatory Referral to Allergy    3. Elevated serum creatinine  -     Basic Metabolic Panel    4. Gastroesophageal reflux disease, unspecified whether esophagitis present                 1. Preventative counseling- continue to work on healthy diet and exercise   2. Food sensitivity- see allergist   3. Elevated creatinine- limit NSAID use, hydrate well, recheck   4. GERD- likely cause of marinara sauce issue, discussed prilosec and pepcid    GYN- UTD

## 2021-04-23 LAB
BUN SERPL-MCNC: 15 MG/DL (ref 6–20)
BUN/CREAT SERPL: 15.3 (ref 7–25)
CALCIUM SERPL-MCNC: 9.9 MG/DL (ref 8.6–10.5)
CHLORIDE SERPL-SCNC: 102 MMOL/L (ref 98–107)
CO2 SERPL-SCNC: 27.2 MMOL/L (ref 22–29)
CREAT SERPL-MCNC: 0.98 MG/DL (ref 0.57–1)
GLUCOSE SERPL-MCNC: 67 MG/DL (ref 65–99)
POTASSIUM SERPL-SCNC: 4.8 MMOL/L (ref 3.5–5.2)
SODIUM SERPL-SCNC: 140 MMOL/L (ref 136–145)

## 2022-04-04 DIAGNOSIS — E03.9 ACQUIRED HYPOTHYROIDISM: Primary | ICD-10-CM

## 2022-04-04 DIAGNOSIS — Z00.00 HEALTH CARE MAINTENANCE: ICD-10-CM

## 2022-04-05 DIAGNOSIS — Z00.00 HEALTH CARE MAINTENANCE: ICD-10-CM

## 2022-04-05 DIAGNOSIS — E03.9 ACQUIRED HYPOTHYROIDISM: ICD-10-CM

## 2022-04-06 LAB
ALBUMIN SERPL-MCNC: 4.4 G/DL (ref 3.8–4.8)
ALBUMIN/GLOB SERPL: 1.8 {RATIO} (ref 1.2–2.2)
ALP SERPL-CCNC: 78 IU/L (ref 44–121)
ALT SERPL-CCNC: 11 IU/L (ref 0–32)
AST SERPL-CCNC: 14 IU/L (ref 0–40)
BASOPHILS # BLD AUTO: 0.1 X10E3/UL (ref 0–0.2)
BASOPHILS NFR BLD AUTO: 1 %
BILIRUB SERPL-MCNC: 0.4 MG/DL (ref 0–1.2)
BUN SERPL-MCNC: 11 MG/DL (ref 6–20)
BUN/CREAT SERPL: 9 (ref 9–23)
CALCIUM SERPL-MCNC: 9.4 MG/DL (ref 8.7–10.2)
CHLORIDE SERPL-SCNC: 106 MMOL/L (ref 96–106)
CHOLEST SERPL-MCNC: 168 MG/DL (ref 100–199)
CHOLEST/HDLC SERPL: 3 RATIO (ref 0–4.4)
CO2 SERPL-SCNC: 21 MMOL/L (ref 20–29)
CREAT SERPL-MCNC: 1.28 MG/DL (ref 0.57–1)
EGFRCR SERPLBLD CKD-EPI 2021: 56 ML/MIN/1.73
EOSINOPHIL # BLD AUTO: 0.2 X10E3/UL (ref 0–0.4)
EOSINOPHIL NFR BLD AUTO: 4 %
ERYTHROCYTE [DISTWIDTH] IN BLOOD BY AUTOMATED COUNT: 12.5 % (ref 11.7–15.4)
GLOBULIN SER CALC-MCNC: 2.4 G/DL (ref 1.5–4.5)
GLUCOSE SERPL-MCNC: 83 MG/DL (ref 65–99)
HCT VFR BLD AUTO: 40.4 % (ref 34–46.6)
HDLC SERPL-MCNC: 56 MG/DL
HGB BLD-MCNC: 13 G/DL (ref 11.1–15.9)
IMM GRANULOCYTES # BLD AUTO: 0 X10E3/UL (ref 0–0.1)
IMM GRANULOCYTES NFR BLD AUTO: 0 %
LDLC SERPL CALC-MCNC: 98 MG/DL (ref 0–99)
LYMPHOCYTES # BLD AUTO: 1.4 X10E3/UL (ref 0.7–3.1)
LYMPHOCYTES NFR BLD AUTO: 23 %
MCH RBC QN AUTO: 30 PG (ref 26.6–33)
MCHC RBC AUTO-ENTMCNC: 32.2 G/DL (ref 31.5–35.7)
MCV RBC AUTO: 93 FL (ref 79–97)
MONOCYTES # BLD AUTO: 0.4 X10E3/UL (ref 0.1–0.9)
MONOCYTES NFR BLD AUTO: 6 %
NEUTROPHILS # BLD AUTO: 3.9 X10E3/UL (ref 1.4–7)
NEUTROPHILS NFR BLD AUTO: 66 %
PLATELET # BLD AUTO: 331 X10E3/UL (ref 150–450)
POTASSIUM SERPL-SCNC: 4.6 MMOL/L (ref 3.5–5.2)
PROT SERPL-MCNC: 6.8 G/DL (ref 6–8.5)
RBC # BLD AUTO: 4.33 X10E6/UL (ref 3.77–5.28)
SODIUM SERPL-SCNC: 140 MMOL/L (ref 134–144)
TRIGL SERPL-MCNC: 75 MG/DL (ref 0–149)
TSH SERPL DL<=0.005 MIU/L-ACNC: 4.01 UIU/ML (ref 0.45–4.5)
VLDLC SERPL CALC-MCNC: 14 MG/DL (ref 5–40)
WBC # BLD AUTO: 6 X10E3/UL (ref 3.4–10.8)

## 2022-05-05 ENCOUNTER — OFFICE VISIT (OUTPATIENT)
Dept: INTERNAL MEDICINE | Facility: CLINIC | Age: 37
End: 2022-05-05

## 2022-05-05 VITALS
HEIGHT: 64 IN | SYSTOLIC BLOOD PRESSURE: 104 MMHG | WEIGHT: 136.6 LBS | DIASTOLIC BLOOD PRESSURE: 58 MMHG | OXYGEN SATURATION: 97 % | HEART RATE: 80 BPM | BODY MASS INDEX: 23.32 KG/M2 | TEMPERATURE: 97.5 F

## 2022-05-05 DIAGNOSIS — Z00.00 HEALTH CARE MAINTENANCE: Primary | ICD-10-CM

## 2022-05-05 DIAGNOSIS — R79.89 ELEVATED SERUM CREATININE: ICD-10-CM

## 2022-05-05 PROCEDURE — 99395 PREV VISIT EST AGE 18-39: CPT | Performed by: NURSE PRACTITIONER

## 2022-05-05 RX ORDER — BUPROPION HYDROCHLORIDE 150 MG/1
150 TABLET ORAL DAILY
COMMUNITY
Start: 2022-04-17 | End: 2023-03-25

## 2022-05-05 NOTE — PROGRESS NOTES
Subjective   Cecille Devi is a 37 y.o. female.     History of Present Illness   The patient is here today for CPE and lab work F/U. She is feeling well. She has low libido, working with GYN. Just started on wellbutrin, did not tolerate 300 mg dose. She feels it has pulled her out of a slump.   UTD with GYN    She is playing tennis. Eating healthy.     Sons are Petar and Oc 20, months.   Oc with a hx of heart sx.     Just had covid.   The following portions of the patient's history were reviewed and updated as appropriate: allergies, current medications, past family history, past medical history, past social history, past surgical history and problem list.    Review of Systems   Constitutional: Negative for chills, fatigue and fever.   HENT: Positive for sore throat (scratchy today ). Negative for ear pain and rhinorrhea.    Eyes: Negative.    Respiratory: Negative for cough and shortness of breath.    Cardiovascular: Negative.    Gastrointestinal: Negative.    Endocrine: Negative for cold intolerance and heat intolerance.   Genitourinary: Negative for breast discharge, breast lump, breast pain, difficulty urinating, dyspareunia, dysuria, flank pain, frequency, genital sores, hematuria, pelvic pain and urgency.   Musculoskeletal: Negative.    Skin: Negative.    Allergic/Immunologic: Negative for environmental allergies and food allergies.   Neurological: Negative.    Hematological: Negative.    Psychiatric/Behavioral: Negative for dysphoric mood and suicidal ideas. The patient is not nervous/anxious.        Objective   Physical Exam  Constitutional:       Appearance: Normal appearance. She is well-developed.      Comments: Body mass index is 23.45 kg/m².     HENT:      Right Ear: Hearing, tympanic membrane, ear canal and external ear normal.      Left Ear: Hearing, tympanic membrane, ear canal and external ear normal.   Eyes:      General: Lids are normal.      Conjunctiva/sclera: Conjunctivae normal.       Pupils: Pupils are equal, round, and reactive to light.   Neck:      Thyroid: No thyroid mass or thyromegaly.      Vascular: No carotid bruit.      Trachea: Trachea normal.   Cardiovascular:      Rate and Rhythm: Normal rate and regular rhythm.      Pulses: Normal pulses.      Heart sounds: Normal heart sounds.   Pulmonary:      Effort: Pulmonary effort is normal.      Breath sounds: Normal breath sounds.   Chest:   Breasts:      Right: No supraclavicular adenopathy.      Left: No supraclavicular adenopathy.       Abdominal:      General: Bowel sounds are normal.      Palpations: Abdomen is soft.      Tenderness: There is no abdominal tenderness.   Musculoskeletal:         General: Normal range of motion.      Cervical back: Normal range of motion.   Lymphadenopathy:      Cervical: No cervical adenopathy.      Upper Body:      Right upper body: No supraclavicular adenopathy.      Left upper body: No supraclavicular adenopathy.      Lower Body: No right inguinal adenopathy. No left inguinal adenopathy.   Skin:     General: Skin is warm and dry.   Neurological:      Mental Status: She is alert and oriented to person, place, and time.      GCS: GCS eye subscore is 4. GCS verbal subscore is 5. GCS motor subscore is 6.      Cranial Nerves: No cranial nerve deficit.      Sensory: No sensory deficit.      Deep Tendon Reflexes:      Reflex Scores:       Patellar reflexes are 2+ on the right side and 2+ on the left side.  Psychiatric:         Speech: Speech normal.         Behavior: Behavior normal. Behavior is cooperative.         Thought Content: Thought content normal.         Judgment: Judgment normal.         Vitals:    05/05/22 1415   BP: 104/58   Pulse: 80   Temp: 97.5 °F (36.4 °C)   SpO2: 97%     Body mass index is 23.45 kg/m².      Assessment/Plan   Diagnoses and all orders for this visit:    1. Health care maintenance (Primary)    2. Elevated serum creatinine  -     Basic Metabolic Panel                 1.  Preventative counseling- continue exercise and healthy diet   2. Elevated creatinine- hydrate, avoid NSAIDs, recheck today

## 2022-05-06 LAB
BUN SERPL-MCNC: 12 MG/DL (ref 6–20)
BUN/CREAT SERPL: 11 (ref 9–23)
CALCIUM SERPL-MCNC: 9.5 MG/DL (ref 8.7–10.2)
CHLORIDE SERPL-SCNC: 101 MMOL/L (ref 96–106)
CO2 SERPL-SCNC: 21 MMOL/L (ref 20–29)
CREAT SERPL-MCNC: 1.08 MG/DL (ref 0.57–1)
EGFRCR SERPLBLD CKD-EPI 2021: 68 ML/MIN/1.73
GLUCOSE SERPL-MCNC: 77 MG/DL (ref 65–99)
POTASSIUM SERPL-SCNC: 4.2 MMOL/L (ref 3.5–5.2)
SODIUM SERPL-SCNC: 140 MMOL/L (ref 134–144)

## 2022-05-10 DIAGNOSIS — R79.89 ELEVATED SERUM CREATININE: Primary | ICD-10-CM

## 2022-06-14 DIAGNOSIS — R79.89 ELEVATED SERUM CREATININE: Primary | ICD-10-CM

## 2022-06-20 ENCOUNTER — OFFICE VISIT (OUTPATIENT)
Dept: INTERNAL MEDICINE | Facility: CLINIC | Age: 37
End: 2022-06-20

## 2022-06-20 VITALS
OXYGEN SATURATION: 99 % | WEIGHT: 134 LBS | BODY MASS INDEX: 22.88 KG/M2 | SYSTOLIC BLOOD PRESSURE: 102 MMHG | HEIGHT: 64 IN | TEMPERATURE: 97.1 F | HEART RATE: 71 BPM | DIASTOLIC BLOOD PRESSURE: 62 MMHG

## 2022-06-20 DIAGNOSIS — R30.0 DYSURIA: Primary | ICD-10-CM

## 2022-06-20 LAB
BILIRUB BLD-MCNC: NEGATIVE MG/DL
CLARITY, POC: CLEAR
COLOR UR: YELLOW
EXPIRATION DATE: ABNORMAL
GLUCOSE UR STRIP-MCNC: NEGATIVE MG/DL
KETONES UR QL: NEGATIVE
LEUKOCYTE EST, POC: ABNORMAL
Lab: 1023
NITRITE UR-MCNC: NEGATIVE MG/ML
PH UR: 6 [PH] (ref 5–8)
PROT UR STRIP-MCNC: NEGATIVE MG/DL
RBC # UR STRIP: ABNORMAL /UL
SP GR UR: 1.02 (ref 1–1.03)
UROBILINOGEN UR QL: NORMAL

## 2022-06-20 PROCEDURE — 99213 OFFICE O/P EST LOW 20 MIN: CPT | Performed by: NURSE PRACTITIONER

## 2022-06-20 PROCEDURE — 81003 URINALYSIS AUTO W/O SCOPE: CPT | Performed by: NURSE PRACTITIONER

## 2022-06-20 NOTE — PROGRESS NOTES
Subjective   Cecille Devi is a 37 y.o. female. Urinary frequency    History of Present Illness    The patient is here today with c/o urinary burning starting on Saturday.   Tested at home and was positive for leuks, neg for nitrates. Concerned for UTI, also has IC.   The pain and frequency has resolved but she continued test further and was pos for leuks last night.   The following portions of the patient's history were reviewed and updated as appropriate: allergies, current medications, past family history, past medical history, past social history, past surgical history and problem list.    Review of Systems   Constitutional: Negative.    Respiratory: Negative.    Cardiovascular: Negative.    Genitourinary: Negative for dysuria, flank pain, frequency, hematuria and pelvic pain.       Objective   Physical Exam  Constitutional:       Appearance: Normal appearance. She is well-developed.   Neck:      Thyroid: No thyromegaly.   Cardiovascular:      Rate and Rhythm: Normal rate and regular rhythm.      Heart sounds: Normal heart sounds.   Pulmonary:      Effort: Pulmonary effort is normal.      Breath sounds: Normal breath sounds.   Abdominal:      Tenderness: There is no right CVA tenderness or left CVA tenderness.   Musculoskeletal:      Cervical back: Normal range of motion and neck supple.   Lymphadenopathy:      Cervical: No cervical adenopathy.   Skin:     General: Skin is warm and dry.   Neurological:      Mental Status: She is alert.   Psychiatric:         Behavior: Behavior normal.         Thought Content: Thought content normal.         Judgment: Judgment normal.         Vitals:    06/20/22 1119   BP: 102/62   Pulse: 71   Temp: 97.1 °F (36.2 °C)   SpO2: 99%     Body mass index is 22.99 kg/m².      Assessment & Plan   Diagnoses and all orders for this visit:    1. Dysuria (Primary)  -     POCT urinalysis dipstick, automated  -     Urine Culture - Urine, Urine, Clean Catch        Current Outpatient  Medications:   •  buPROPion XL (WELLBUTRIN XL) 150 MG 24 hr tablet, Take 150 mg by mouth Daily., Disp: , Rfl:   •  NORTREL 1/35, 28, 1-35 MG-MCG per tablet, Take 1 tablet by mouth Daily., Disp: , Rfl: 5           1. Dysuria- check urine culture, to see nephrology July 5th. Hydrate well.

## 2022-06-21 RX ORDER — NITROFURANTOIN 25; 75 MG/1; MG/1
100 CAPSULE ORAL 2 TIMES DAILY
Qty: 10 CAPSULE | Refills: 0 | Status: SHIPPED | OUTPATIENT
Start: 2022-06-21 | End: 2022-06-26

## 2022-06-26 LAB
BACTERIA UR CULT: ABNORMAL
BACTERIA UR CULT: ABNORMAL
OTHER ANTIBIOTIC SUSC ISLT: ABNORMAL

## 2022-06-27 RX ORDER — CIPROFLOXACIN 250 MG/1
250 TABLET, FILM COATED ORAL 2 TIMES DAILY
Qty: 10 TABLET | Refills: 0 | Status: SHIPPED | OUTPATIENT
Start: 2022-06-27 | End: 2023-03-25

## 2023-04-27 DIAGNOSIS — Z00.00 HEALTH CARE MAINTENANCE: Primary | ICD-10-CM

## 2023-05-02 LAB
ALBUMIN SERPL-MCNC: 4.4 G/DL (ref 3.5–5.2)
ALBUMIN/GLOB SERPL: 2 G/DL
ALP SERPL-CCNC: 70 U/L (ref 39–117)
ALT SERPL-CCNC: 12 U/L (ref 1–33)
AST SERPL-CCNC: 12 U/L (ref 1–32)
BASOPHILS # BLD AUTO: 0.07 10*3/MM3 (ref 0–0.2)
BASOPHILS NFR BLD AUTO: 1.2 % (ref 0–1.5)
BILIRUB SERPL-MCNC: 0.5 MG/DL (ref 0–1.2)
BUN SERPL-MCNC: 10 MG/DL (ref 6–20)
BUN/CREAT SERPL: 7.9 (ref 7–25)
CALCIUM SERPL-MCNC: 9.7 MG/DL (ref 8.6–10.5)
CHLORIDE SERPL-SCNC: 106 MMOL/L (ref 98–107)
CHOLEST SERPL-MCNC: 157 MG/DL (ref 0–200)
CHOLEST/HDLC SERPL: 2.62 {RATIO}
CO2 SERPL-SCNC: 27.7 MMOL/L (ref 22–29)
CREAT SERPL-MCNC: 1.26 MG/DL (ref 0.57–1)
EGFRCR SERPLBLD CKD-EPI 2021: 56.5 ML/MIN/1.73
EOSINOPHIL # BLD AUTO: 0.11 10*3/MM3 (ref 0–0.4)
EOSINOPHIL NFR BLD AUTO: 1.9 % (ref 0.3–6.2)
ERYTHROCYTE [DISTWIDTH] IN BLOOD BY AUTOMATED COUNT: 12.6 % (ref 12.3–15.4)
GLOBULIN SER CALC-MCNC: 2.2 GM/DL
GLUCOSE SERPL-MCNC: 79 MG/DL (ref 65–99)
HCT VFR BLD AUTO: 37.6 % (ref 34–46.6)
HDLC SERPL-MCNC: 60 MG/DL (ref 40–60)
HGB BLD-MCNC: 12.4 G/DL (ref 12–15.9)
IMM GRANULOCYTES # BLD AUTO: 0.02 10*3/MM3 (ref 0–0.05)
IMM GRANULOCYTES NFR BLD AUTO: 0.3 % (ref 0–0.5)
LDLC SERPL CALC-MCNC: 84 MG/DL (ref 0–100)
LYMPHOCYTES # BLD AUTO: 1.66 10*3/MM3 (ref 0.7–3.1)
LYMPHOCYTES NFR BLD AUTO: 28.4 % (ref 19.6–45.3)
MCH RBC QN AUTO: 30 PG (ref 26.6–33)
MCHC RBC AUTO-ENTMCNC: 33 G/DL (ref 31.5–35.7)
MCV RBC AUTO: 91 FL (ref 79–97)
MONOCYTES # BLD AUTO: 0.43 10*3/MM3 (ref 0.1–0.9)
MONOCYTES NFR BLD AUTO: 7.4 % (ref 5–12)
NEUTROPHILS # BLD AUTO: 3.56 10*3/MM3 (ref 1.7–7)
NEUTROPHILS NFR BLD AUTO: 60.8 % (ref 42.7–76)
NRBC BLD AUTO-RTO: 0 /100 WBC (ref 0–0.2)
PLATELET # BLD AUTO: 318 10*3/MM3 (ref 140–450)
POTASSIUM SERPL-SCNC: 4.3 MMOL/L (ref 3.5–5.2)
PROT SERPL-MCNC: 6.6 G/DL (ref 6–8.5)
RBC # BLD AUTO: 4.13 10*6/MM3 (ref 3.77–5.28)
SODIUM SERPL-SCNC: 141 MMOL/L (ref 136–145)
TRIGL SERPL-MCNC: 68 MG/DL (ref 0–150)
TSH SERPL DL<=0.005 MIU/L-ACNC: 3.3 UIU/ML (ref 0.27–4.2)
VLDLC SERPL CALC-MCNC: 13 MG/DL (ref 5–40)
WBC # BLD AUTO: 5.85 10*3/MM3 (ref 3.4–10.8)

## 2023-05-08 ENCOUNTER — OFFICE VISIT (OUTPATIENT)
Dept: INTERNAL MEDICINE | Facility: CLINIC | Age: 38
End: 2023-05-08
Payer: COMMERCIAL

## 2023-05-08 VITALS
SYSTOLIC BLOOD PRESSURE: 102 MMHG | OXYGEN SATURATION: 97 % | DIASTOLIC BLOOD PRESSURE: 70 MMHG | HEIGHT: 64 IN | WEIGHT: 129.3 LBS | HEART RATE: 72 BPM | BODY MASS INDEX: 22.07 KG/M2 | TEMPERATURE: 98 F

## 2023-05-08 DIAGNOSIS — Z00.00 HEALTH CARE MAINTENANCE: Primary | ICD-10-CM

## 2023-05-08 DIAGNOSIS — N18.2 CHRONIC RENAL IMPAIRMENT, STAGE 2 (MILD): ICD-10-CM

## 2023-05-08 PROBLEM — N18.31 STAGE 3A CHRONIC KIDNEY DISEASE: Status: ACTIVE | Noted: 2022-07-04

## 2023-05-08 PROCEDURE — 99395 PREV VISIT EST AGE 18-39: CPT | Performed by: NURSE PRACTITIONER

## 2023-05-08 NOTE — PROGRESS NOTES
Subjective   Cecille Devi is a 38 y.o. female.     History of Present Illness   The patient is here today for CPE and lab work F/U. Eating healthy and exercising. Playing tennis twice a week.     BMI is within normal parameters. No other follow-up for BMI required.     Elevated creatinine- has had a full work up with nephrology, suggested stage 2 CKD, they feel due to poss due to nutritional supplements and ibuprofen, continuing to follow     Random generalized itching. Unsure of cause, no rash.     GYN- UTD    Anxiety- doing well with med    The boys 3 1/2 twins   Son Oc with medicaid waiver, both boys at [facility].   The following portions of the patient's history were reviewed and updated as appropriate: allergies, current medications, past family history, past medical history, past social history, past surgical history and problem list.    Review of Systems   Constitutional: Negative for chills, fatigue and fever.   HENT: Negative for ear pain, rhinorrhea and sore throat.    Eyes: Negative.    Respiratory: Negative for cough and shortness of breath.    Cardiovascular: Negative.    Gastrointestinal: Negative.    Endocrine: Negative for cold intolerance and heat intolerance.   Genitourinary: Negative for breast discharge, breast lump, breast pain, difficulty urinating, dyspareunia, dysuria, flank pain, frequency, genital sores, hematuria, pelvic pain and urgency.   Musculoskeletal: Negative.    Skin: Negative.    Allergic/Immunologic: Negative for environmental allergies and food allergies.   Neurological: Negative.    Hematological: Negative.    Psychiatric/Behavioral: Negative for dysphoric mood and suicidal ideas. The patient is not nervous/anxious.        Objective   Physical Exam  Constitutional:       Appearance: Normal appearance. She is well-developed.      Comments: Body mass index is 22.18 kg/m².     HENT:      Right Ear: Hearing, tympanic membrane, ear canal and external ear normal.      Left  Ear: Hearing, tympanic membrane, ear canal and external ear normal.   Eyes:      General: Lids are normal.      Conjunctiva/sclera: Conjunctivae normal.      Pupils: Pupils are equal, round, and reactive to light.   Neck:      Thyroid: No thyroid mass or thyromegaly.      Vascular: No carotid bruit.      Trachea: Trachea normal.   Cardiovascular:      Rate and Rhythm: Normal rate and regular rhythm.      Pulses: Normal pulses.      Heart sounds: Normal heart sounds.   Pulmonary:      Effort: Pulmonary effort is normal.      Breath sounds: Normal breath sounds.   Abdominal:      General: Bowel sounds are normal.      Palpations: Abdomen is soft.      Tenderness: There is no abdominal tenderness.   Musculoskeletal:         General: Normal range of motion.      Cervical back: Normal range of motion.   Lymphadenopathy:      Cervical: No cervical adenopathy.      Upper Body:      Right upper body: No supraclavicular adenopathy.      Left upper body: No supraclavicular adenopathy.      Lower Body: No right inguinal adenopathy. No left inguinal adenopathy.   Skin:     General: Skin is warm and dry.   Neurological:      Mental Status: She is alert and oriented to person, place, and time.      GCS: GCS eye subscore is 4. GCS verbal subscore is 5. GCS motor subscore is 6.      Cranial Nerves: No cranial nerve deficit.      Sensory: No sensory deficit.      Deep Tendon Reflexes:      Reflex Scores:       Patellar reflexes are 2+ on the right side and 2+ on the left side.  Psychiatric:         Speech: Speech normal.         Behavior: Behavior normal. Behavior is cooperative.         Thought Content: Thought content normal.         Judgment: Judgment normal.         Vitals:    05/08/23 1420   BP: 102/70   Pulse: 72   Temp: 98 °F (36.7 °C)   SpO2: 97%     Body mass index is 22.18 kg/m².    Current Outpatient Medications:   •  acetaminophen (TYLENOL) 500 MG tablet, Take 1 tablet by mouth Every 6 (Six) Hours As Needed., Disp: ,  Rfl:   •  buPROPion XL (WELLBUTRIN XL) 300 MG 24 hr tablet, Take 1 tablet by mouth Daily., Disp: , Rfl:   •  NORTREL 1/35, 28, 1-35 MG-MCG per tablet, Take 1 tablet by mouth Daily., Disp: , Rfl: 5    Assessment & Plan   Diagnoses and all orders for this visit:    1. Health care maintenance (Primary)    2. Chronic renal impairment, stage 2 (mild)      Orders Only on 04/27/2023   Component Date Value Ref Range Status   • WBC 05/01/2023 5.85  3.40 - 10.80 10*3/mm3 Final   • RBC 05/01/2023 4.13  3.77 - 5.28 10*6/mm3 Final   • Hemoglobin 05/01/2023 12.4  12.0 - 15.9 g/dL Final   • Hematocrit 05/01/2023 37.6  34.0 - 46.6 % Final   • MCV 05/01/2023 91.0  79.0 - 97.0 fL Final   • MCH 05/01/2023 30.0  26.6 - 33.0 pg Final   • MCHC 05/01/2023 33.0  31.5 - 35.7 g/dL Final   • RDW 05/01/2023 12.6  12.3 - 15.4 % Final   • Platelets 05/01/2023 318  140 - 450 10*3/mm3 Final   • Neutrophil Rel % 05/01/2023 60.8  42.7 - 76.0 % Final   • Lymphocyte Rel % 05/01/2023 28.4  19.6 - 45.3 % Final   • Monocyte Rel % 05/01/2023 7.4  5.0 - 12.0 % Final   • Eosinophil Rel % 05/01/2023 1.9  0.3 - 6.2 % Final   • Basophil Rel % 05/01/2023 1.2  0.0 - 1.5 % Final   • Neutrophils Absolute 05/01/2023 3.56  1.70 - 7.00 10*3/mm3 Final   • Lymphocytes Absolute 05/01/2023 1.66  0.70 - 3.10 10*3/mm3 Final   • Monocytes Absolute 05/01/2023 0.43  0.10 - 0.90 10*3/mm3 Final   • Eosinophils Absolute 05/01/2023 0.11  0.00 - 0.40 10*3/mm3 Final   • Basophils Absolute 05/01/2023 0.07  0.00 - 0.20 10*3/mm3 Final   • Immature Granulocyte Rel % 05/01/2023 0.3  0.0 - 0.5 % Final   • Immature Grans Absolute 05/01/2023 0.02  0.00 - 0.05 10*3/mm3 Final   • nRBC 05/01/2023 0.0  0.0 - 0.2 /100 WBC Final   • Glucose 05/01/2023 79  65 - 99 mg/dL Final   • BUN 05/01/2023 10  6 - 20 mg/dL Final   • Creatinine 05/01/2023 1.26 (H)  0.57 - 1.00 mg/dL Final   • EGFR Result 05/01/2023 56.5 (L)  >60.0 mL/min/1.73 Final    Comment: GFR Normal >60  Chronic Kidney Disease  <60  Kidney Failure <15     • BUN/Creatinine Ratio 05/01/2023 7.9  7.0 - 25.0 Final   • Sodium 05/01/2023 141  136 - 145 mmol/L Final   • Potassium 05/01/2023 4.3  3.5 - 5.2 mmol/L Final   • Chloride 05/01/2023 106  98 - 107 mmol/L Final   • Total CO2 05/01/2023 27.7  22.0 - 29.0 mmol/L Final   • Calcium 05/01/2023 9.7  8.6 - 10.5 mg/dL Final   • Total Protein 05/01/2023 6.6  6.0 - 8.5 g/dL Final   • Albumin 05/01/2023 4.4  3.5 - 5.2 g/dL Final   • Globulin 05/01/2023 2.2  gm/dL Final   • A/G Ratio 05/01/2023 2.0  g/dL Final   • Total Bilirubin 05/01/2023 0.5  0.0 - 1.2 mg/dL Final   • Alkaline Phosphatase 05/01/2023 70  39 - 117 U/L Final   • AST (SGOT) 05/01/2023 12  1 - 32 U/L Final   • ALT (SGPT) 05/01/2023 12  1 - 33 U/L Final   • Total Cholesterol 05/01/2023 157  0 - 200 mg/dL Final    Comment: Cholesterol Reference Ranges  (U.S. Department of Health and Human Services ATP III  Classifications)  Desirable          <200 mg/dL  Borderline High    200-239 mg/dL  High Risk          >240 mg/dL  Triglyceride Reference Ranges  (U.S. Department of Health and Human Services ATP III  Classifications)  Normal           <150 mg/dL  Borderline High  150-199 mg/dL  High             200-499 mg/dL  Very High        >500 mg/dL  HDL Reference Ranges  (U.S. Department of Health and Human Services ATP III  Classifications)  Low     <40 mg/dl (major risk factor for CHD)  High    >60 mg/dl ('negative' risk factor for CHD)  LDL Reference Ranges  (U.S. Department of Health and Human Services ATP III  Classifications)  Optimal          <100 mg/dL  Near Optimal     100-129 mg/dL  Borderline High  130-159 mg/dL  High             160-189 mg/dL  Very High        >189 mg/dL     • Triglycerides 05/01/2023 68  0 - 150 mg/dL Final   • HDL Cholesterol 05/01/2023 60  40 - 60 mg/dL Final   • VLDL Cholesterol Bhaskar 05/01/2023 13  5 - 40 mg/dL Final   • LDL Chol Calc ([facility]) 05/01/2023 84  0 - 100 mg/dL Final   • Chol/HDL Ratio 05/01/2023 2.62   Final    • TSH 05/01/2023 3.300  0.270 - 4.200 uIU/mL Final              1. Preventative counseling- continue to exercise and eat healthy   2. CRI- continue with nephrology, hydrate well, no NSAIDs

## 2023-11-01 ENCOUNTER — OFFICE VISIT (OUTPATIENT)
Dept: INTERNAL MEDICINE | Facility: CLINIC | Age: 38
End: 2023-11-01
Payer: COMMERCIAL

## 2023-11-01 ENCOUNTER — HOSPITAL ENCOUNTER (OUTPATIENT)
Dept: GENERAL RADIOLOGY | Facility: HOSPITAL | Age: 38
Discharge: HOME | End: 2023-11-01
Admitting: NURSE PRACTITIONER
Payer: COMMERCIAL

## 2023-11-01 VITALS
DIASTOLIC BLOOD PRESSURE: 70 MMHG | BODY MASS INDEX: 22.4 KG/M2 | HEART RATE: 61 BPM | OXYGEN SATURATION: 97 % | HEIGHT: 64 IN | SYSTOLIC BLOOD PRESSURE: 98 MMHG | WEIGHT: 131.2 LBS

## 2023-11-01 DIAGNOSIS — J45.20 MILD INTERMITTENT REACTIVE AIRWAY DISEASE WITHOUT COMPLICATION: ICD-10-CM

## 2023-11-01 DIAGNOSIS — R05.9 COUGH, UNSPECIFIED TYPE: Primary | ICD-10-CM

## 2023-11-01 DIAGNOSIS — R51.9 FRONTAL HEADACHE: ICD-10-CM

## 2023-11-01 DIAGNOSIS — R05.9 COUGH, UNSPECIFIED TYPE: ICD-10-CM

## 2023-11-01 PROCEDURE — 99214 OFFICE O/P EST MOD 30 MIN: CPT | Performed by: NURSE PRACTITIONER

## 2023-11-01 PROCEDURE — 71046 X-RAY EXAM CHEST 2 VIEWS: CPT

## 2023-11-01 RX ORDER — ALBUTEROL SULFATE 90 UG/1
2 AEROSOL, METERED RESPIRATORY (INHALATION) EVERY 4 HOURS PRN
Qty: 18 G | Refills: 1 | Status: SHIPPED | OUTPATIENT
Start: 2023-11-01

## 2023-11-01 RX ORDER — MONTELUKAST SODIUM 10 MG/1
10 TABLET ORAL NIGHTLY
Qty: 90 TABLET | Refills: 2 | Status: SHIPPED | OUTPATIENT
Start: 2023-11-01

## 2023-11-01 NOTE — PROGRESS NOTES
Subjective   Cecille Devi is a 38 y.o. female.     History of Present Illness    The patient is here today with c/o coughing for over a month. It has been intermittent. It is a heavy chest cough, feels a tickle with it. Worse with talking. Laying down is the worst. She does feel some PND.   Started after having COVID, 1st time she had it was spring of 2021.   She noticed with playing tennis recently she had terrible coughing fit.     No hx of asthma.     Frontal headache just today, tylenol is not helping.   Mom is Keo Alexander.   The following portions of the patient's history were reviewed and updated as appropriate: allergies, current medications, past family history, past medical history, past social history, past surgical history and problem list.    Review of Systems   Constitutional: Negative.    HENT:  Positive for postnasal drip and sinus pressure. Negative for ear pain, rhinorrhea and sore throat.    Respiratory:  Positive for cough and shortness of breath (with coughing fits). Negative for wheezing.    Cardiovascular: Negative.    Neurological:  Positive for headache.       Objective   Physical Exam  Constitutional:       Appearance: Normal appearance. She is well-developed.   HENT:      Right Ear: Hearing, tympanic membrane, ear canal and external ear normal.      Left Ear: Hearing, tympanic membrane, ear canal and external ear normal.      Nose:      Right Sinus: No maxillary sinus tenderness or frontal sinus tenderness.      Left Sinus: No maxillary sinus tenderness or frontal sinus tenderness.      Mouth/Throat:      Lips: Pink.      Mouth: Mucous membranes are moist.      Palate: No mass.      Pharynx: Oropharynx is clear.      Comments: Mild PP cobbling present  Neck:      Thyroid: No thyromegaly.   Cardiovascular:      Rate and Rhythm: Normal rate and regular rhythm.      Heart sounds: Normal heart sounds.   Pulmonary:      Effort: Pulmonary effort is normal.      Breath sounds: Normal breath  sounds.   Musculoskeletal:      Cervical back: Normal range of motion and neck supple.   Lymphadenopathy:      Cervical: No cervical adenopathy.   Skin:     General: Skin is warm and dry.   Neurological:      Mental Status: She is alert.      Cranial Nerves: Cranial nerves 2-12 are intact.      Sensory: Sensation is intact.      Motor: Motor function is intact.   Psychiatric:         Behavior: Behavior normal.         Thought Content: Thought content normal.         Judgment: Judgment normal.         Vitals:    11/01/23 1104   BP: 98/70   Pulse: 61   SpO2: 97%     Body mass index is 22.51 kg/m².      Current Outpatient Medications:     acetaminophen (TYLENOL) 500 MG tablet, Take 1 tablet by mouth Every 6 (Six) Hours As Needed., Disp: , Rfl:     buPROPion XL (WELLBUTRIN XL) 300 MG 24 hr tablet, Take 1 tablet by mouth Daily., Disp: , Rfl:     NORTREL 1/35, 28, 1-35 MG-MCG per tablet, Take 1 tablet by mouth Daily., Disp: , Rfl: 5    albuterol sulfate  (90 Base) MCG/ACT inhaler, Inhale 2 puffs Every 4 (Four) Hours As Needed for Shortness of Air., Disp: 18 g, Rfl: 1    montelukast (Singulair) 10 MG tablet, Take 1 tablet by mouth Every Night., Disp: 90 tablet, Rfl: 2   Assessment & Plan   Diagnoses and all orders for this visit:    1. Cough, unspecified type (Primary)  -     montelukast (Singulair) 10 MG tablet; Take 1 tablet by mouth Every Night.  Dispense: 90 tablet; Refill: 2  -     albuterol sulfate  (90 Base) MCG/ACT inhaler; Inhale 2 puffs Every 4 (Four) Hours As Needed for Shortness of Air.  Dispense: 18 g; Refill: 1  -     XR Chest PA & Lateral; Future  -     PFT / Spirometry - Order Using Outpatient Pulmonary Function Testing SmartSet    2. Frontal headache    3. Mild intermittent reactive airway disease without complication  -     montelukast (Singulair) 10 MG tablet; Take 1 tablet by mouth Every Night.  Dispense: 90 tablet; Refill: 2  -     albuterol sulfate  (90 Base) MCG/ACT inhaler;  Inhale 2 puffs Every 4 (Four) Hours As Needed for Shortness of Air.  Dispense: 18 g; Refill: 1  -     PFT / Spirometry - Order Using Outpatient Pulmonary Function Testing SmartSet                 1. Cough- concern for RAD vs asthma, check CXR, will give montelukast (discussed SEs) and albuterol, check PFTs   Return if symptoms persist or worsen  2. Frontal headache- given zyrtec in office, also will start montelukast, can try caffeine

## 2023-11-08 DIAGNOSIS — R05.3 CHRONIC COUGH: Primary | ICD-10-CM

## 2023-11-08 RX ORDER — FLUTICASONE PROPIONATE AND SALMETEROL 100; 50 UG/1; UG/1
1 POWDER RESPIRATORY (INHALATION) 2 TIMES DAILY
Qty: 180 EACH | Refills: 0 | Status: SHIPPED | OUTPATIENT
Start: 2023-11-08

## 2023-11-08 RX ORDER — FLUTICASONE PROPIONATE AND SALMETEROL 100; 50 UG/1; UG/1
1 POWDER RESPIRATORY (INHALATION)
Qty: 60 EACH | Refills: 0 | Status: SHIPPED | OUTPATIENT
Start: 2023-11-08 | End: 2023-11-08

## 2023-11-13 DIAGNOSIS — R05.9 COUGH, UNSPECIFIED TYPE: Primary | ICD-10-CM

## 2023-12-05 DIAGNOSIS — R05.3 CHRONIC COUGH: Primary | ICD-10-CM

## 2023-12-27 RX ORDER — AMOXICILLIN AND CLAVULANATE POTASSIUM 875; 125 MG/1; MG/1
1 TABLET, FILM COATED ORAL 2 TIMES DAILY
Qty: 20 TABLET | Refills: 0 | Status: SHIPPED | OUTPATIENT
Start: 2023-12-27

## 2023-12-27 NOTE — TELEPHONE ENCOUNTER
I was not aware of this message because it was sent to me. Pt states she is feeling better but it comes and goes. I sent medicine to pharmacy.

## 2024-05-02 ENCOUNTER — LAB (OUTPATIENT)
Dept: INTERNAL MEDICINE | Facility: CLINIC | Age: 39
End: 2024-05-02
Payer: COMMERCIAL

## 2024-05-02 DIAGNOSIS — Z00.00 HEALTH CARE MAINTENANCE: Primary | ICD-10-CM

## 2024-05-02 LAB
ALBUMIN SERPL-MCNC: 4.2 G/DL (ref 3.5–5.2)
ALBUMIN/GLOB SERPL: 1.8 G/DL
ALP SERPL-CCNC: 72 U/L (ref 39–117)
ALT SERPL-CCNC: 9 U/L (ref 1–33)
AST SERPL-CCNC: 15 U/L (ref 1–32)
BASOPHILS # BLD AUTO: 0.03 10*3/MM3 (ref 0–0.2)
BASOPHILS NFR BLD AUTO: 0.4 % (ref 0–1.5)
BILIRUB SERPL-MCNC: 0.4 MG/DL (ref 0–1.2)
BUN SERPL-MCNC: 13 MG/DL (ref 6–20)
BUN/CREAT SERPL: 10.7 (ref 7–25)
CALCIUM SERPL-MCNC: 9 MG/DL (ref 8.6–10.5)
CHLORIDE SERPL-SCNC: 106 MMOL/L (ref 98–107)
CHOLEST SERPL-MCNC: 173 MG/DL (ref 0–200)
CHOLEST/HDLC SERPL: 2.84 {RATIO}
CO2 SERPL-SCNC: 26.9 MMOL/L (ref 22–29)
CREAT SERPL-MCNC: 1.21 MG/DL (ref 0.57–1)
EGFRCR SERPLBLD CKD-EPI 2021: 59 ML/MIN/1.73
EOSINOPHIL # BLD AUTO: 0.21 10*3/MM3 (ref 0–0.4)
EOSINOPHIL NFR BLD AUTO: 2.8 % (ref 0.3–6.2)
ERYTHROCYTE [DISTWIDTH] IN BLOOD BY AUTOMATED COUNT: 12.7 % (ref 12.3–15.4)
GLOBULIN SER CALC-MCNC: 2.4 GM/DL
GLUCOSE SERPL-MCNC: 78 MG/DL (ref 65–99)
HCT VFR BLD AUTO: 40.7 % (ref 34–46.6)
HDLC SERPL-MCNC: 61 MG/DL (ref 40–60)
HGB BLD-MCNC: 13.3 G/DL (ref 12–15.9)
IMM GRANULOCYTES # BLD AUTO: 0.04 10*3/MM3 (ref 0–0.05)
IMM GRANULOCYTES NFR BLD AUTO: 0.5 % (ref 0–0.5)
LDLC SERPL CALC-MCNC: 98 MG/DL (ref 0–100)
LYMPHOCYTES # BLD AUTO: 2.04 10*3/MM3 (ref 0.7–3.1)
LYMPHOCYTES NFR BLD AUTO: 27.4 % (ref 19.6–45.3)
MCH RBC QN AUTO: 30.1 PG (ref 26.6–33)
MCHC RBC AUTO-ENTMCNC: 32.7 G/DL (ref 31.5–35.7)
MCV RBC AUTO: 92.1 FL (ref 79–97)
MONOCYTES # BLD AUTO: 0.56 10*3/MM3 (ref 0.1–0.9)
MONOCYTES NFR BLD AUTO: 7.5 % (ref 5–12)
NEUTROPHILS # BLD AUTO: 4.56 10*3/MM3 (ref 1.7–7)
NEUTROPHILS NFR BLD AUTO: 61.4 % (ref 42.7–76)
NRBC BLD AUTO-RTO: 0 /100 WBC (ref 0–0.2)
PLATELET # BLD AUTO: 351 10*3/MM3 (ref 140–450)
POTASSIUM SERPL-SCNC: 4.4 MMOL/L (ref 3.5–5.2)
PROT SERPL-MCNC: 6.6 G/DL (ref 6–8.5)
RBC # BLD AUTO: 4.42 10*6/MM3 (ref 3.77–5.28)
SODIUM SERPL-SCNC: 141 MMOL/L (ref 136–145)
TRIGL SERPL-MCNC: 72 MG/DL (ref 0–150)
TSH SERPL DL<=0.005 MIU/L-ACNC: 3.57 UIU/ML (ref 0.27–4.2)
VLDLC SERPL CALC-MCNC: 14 MG/DL (ref 5–40)
WBC # BLD AUTO: 7.44 10*3/MM3 (ref 3.4–10.8)

## 2024-05-31 ENCOUNTER — TELEPHONE (OUTPATIENT)
Dept: INTERNAL MEDICINE | Facility: CLINIC | Age: 39
End: 2024-05-31

## 2024-05-31 NOTE — TELEPHONE ENCOUNTER
DELETE AFTER REVIEWING: Send this encounter to the appropriate pool. See your Call Action Grid or Workflows for direction.    Caller: Cecille Devi    Relationship to patient: Self    Best call back number: [phone]     Chief complaint:      Type of visit:      Requested date:       If rescheduling, when is the original appointment:       Additional notes:PATIENT CALLED HER LAST PHYSICAL APPT WAS ON 5/22/24 BUT THE OFFICE HAD TO CANCEL IT.  THE HUB ONLY HAS 8/01/24 THAT IS FIRST AVAILABLE.  PATIENT STATED THAT SHE WAS TOLD BY THE OFFICE THAT THEY WAS GOING TO LOOK AT GETTING THE APPTS BACK SOONER.  PLEASE CALL TO SCHEDULE HER FOR THE PHYSICAL BEFORE AUGUST.

## 2024-07-01 ENCOUNTER — OFFICE VISIT (OUTPATIENT)
Dept: INTERNAL MEDICINE | Facility: CLINIC | Age: 39
End: 2024-07-01
Payer: COMMERCIAL

## 2024-07-01 VITALS
WEIGHT: 140 LBS | HEIGHT: 65 IN | DIASTOLIC BLOOD PRESSURE: 70 MMHG | HEART RATE: 72 BPM | OXYGEN SATURATION: 98 % | BODY MASS INDEX: 23.32 KG/M2 | SYSTOLIC BLOOD PRESSURE: 108 MMHG

## 2024-07-01 DIAGNOSIS — Z00.00 HEALTH CARE MAINTENANCE: Primary | ICD-10-CM

## 2024-07-01 DIAGNOSIS — N18.1 CRI (CHRONIC RENAL INSUFFICIENCY), STAGE 1: ICD-10-CM

## 2024-07-01 DIAGNOSIS — R20.2 TINGLING: ICD-10-CM

## 2024-07-01 DIAGNOSIS — R11.0 NAUSEA: ICD-10-CM

## 2024-07-01 PROCEDURE — 99395 PREV VISIT EST AGE 18-39: CPT | Performed by: NURSE PRACTITIONER

## 2024-07-01 RX ORDER — ONDANSETRON 4 MG/1
4 TABLET, FILM COATED ORAL EVERY 8 HOURS PRN
Qty: 30 TABLET | Refills: 0 | Status: SHIPPED | OUTPATIENT
Start: 2024-07-01

## 2024-07-01 RX ORDER — ONDANSETRON 4 MG/1
4 TABLET, ORALLY DISINTEGRATING ORAL EVERY 8 HOURS PRN
Qty: 30 TABLET | Refills: 1 | Status: SHIPPED | OUTPATIENT
Start: 2024-07-01 | End: 2024-07-01

## 2024-07-01 NOTE — PROGRESS NOTES
Subjective   Cecille Devi is a 39 y.o. female.     History of Present Illness   The patient is here today for CPE and lab work F/U. She is feeling well. She is sleeping well. Playing tennis several times a week. She eats pretty healthy.     Allergist- seeing, has steroid inhaler and albuterol at home and using as PRN  Elevated creatinine- has seen nephrology, seeing again in 18 months, testing looks good.     She avoids tomato based foods because it aggravates her stomach but would like a refill on zofran for the rare nausea she gets from that.     BMI is within normal parameters. No other follow-up for BMI required.   Mom is Keo Alexander.   The following portions of the patient's history were reviewed and updated as appropriate: allergies, current medications, past family history, past medical history, past social history, past surgical history and problem list.    Review of Systems   Constitutional:  Negative for chills, fatigue and fever.   HENT:  Negative for ear pain, rhinorrhea and sore throat.    Eyes: Negative.    Respiratory:  Negative for cough and shortness of breath.    Cardiovascular: Negative.    Gastrointestinal: Negative.    Endocrine: Negative for cold intolerance and heat intolerance.   Genitourinary:  Negative for breast discharge, breast lump, breast pain, difficulty urinating, dyspareunia, dysuria, flank pain, frequency, genital sores, hematuria, pelvic pain and urgency.   Musculoskeletal: Negative.    Skin: Negative.    Allergic/Immunologic: Negative for environmental allergies and food allergies.   Neurological: Negative.  Negative for numbness (tingling in her feet with tennis. She does have a new pair of shoes. Sometimes with playing her toes would get numb and ache, now better with a wider toe box.).   Hematological: Negative.    Psychiatric/Behavioral:  Negative for dysphoric mood and suicidal ideas. The patient is not nervous/anxious.        Objective   Physical Exam  Constitutional:        Appearance: Normal appearance. She is well-developed.      Comments: Body mass index is 23.3 kg/m².     HENT:      Right Ear: Hearing, tympanic membrane, ear canal and external ear normal.      Left Ear: Hearing, tympanic membrane, ear canal and external ear normal.   Eyes:      General: Lids are normal.      Conjunctiva/sclera: Conjunctivae normal.      Pupils: Pupils are equal, round, and reactive to light.   Neck:      Thyroid: No thyroid mass or thyromegaly.      Vascular: No carotid bruit.      Trachea: Trachea normal.   Cardiovascular:      Rate and Rhythm: Normal rate and regular rhythm.      Pulses: Normal pulses.      Heart sounds: Normal heart sounds.   Pulmonary:      Effort: Pulmonary effort is normal.      Breath sounds: Normal breath sounds.   Abdominal:      General: Bowel sounds are normal.      Palpations: Abdomen is soft.      Tenderness: There is no abdominal tenderness.   Musculoskeletal:         General: Normal range of motion.      Cervical back: Normal range of motion.   Lymphadenopathy:      Cervical: No cervical adenopathy.      Upper Body:      Right upper body: No supraclavicular adenopathy.      Left upper body: No supraclavicular adenopathy.      Lower Body: No right inguinal adenopathy. No left inguinal adenopathy.   Skin:     General: Skin is warm and dry.   Neurological:      Mental Status: She is alert and oriented to person, place, and time.      GCS: GCS eye subscore is 4. GCS verbal subscore is 5. GCS motor subscore is 6.      Cranial Nerves: No cranial nerve deficit.      Sensory: No sensory deficit.      Deep Tendon Reflexes:      Reflex Scores:       Patellar reflexes are 2+ on the right side and 2+ on the left side.  Psychiatric:         Speech: Speech normal.         Behavior: Behavior normal. Behavior is cooperative.         Thought Content: Thought content normal.         Judgment: Judgment normal.         Vitals:    07/01/24 1118   BP: 108/70   Pulse: 72   SpO2: 98%      Body mass index is 23.3 kg/m².      Current Outpatient Medications:     acetaminophen (TYLENOL) 500 MG tablet, Take 1 tablet by mouth Every 6 (Six) Hours As Needed., Disp: , Rfl:     buPROPion XL (WELLBUTRIN XL) 300 MG 24 hr tablet, Take 1 tablet by mouth Daily., Disp: , Rfl:     NORTREL 1/35, 28, 1-35 MG-MCG per tablet, Take 1 tablet by mouth Daily., Disp: , Rfl: 5    albuterol sulfate  (90 Base) MCG/ACT inhaler, Inhale 2 puffs Every 4 (Four) Hours As Needed for Shortness of Air., Disp: 18 g, Rfl: 1    ondansetron ODT (ZOFRAN-ODT) 4 MG disintegrating tablet, Place 1 tablet on the tongue Every 8 (Eight) Hours As Needed for Nausea or Vomiting., Disp: 30 tablet, Rfl: 1   Lab on 05/02/2024   Component Date Value Ref Range Status    WBC 05/02/2024 7.44  3.40 - 10.80 10*3/mm3 Final    RBC 05/02/2024 4.42  3.77 - 5.28 10*6/mm3 Final    Hemoglobin 05/02/2024 13.3  12.0 - 15.9 g/dL Final    Hematocrit 05/02/2024 40.7  34.0 - 46.6 % Final    MCV 05/02/2024 92.1  79.0 - 97.0 fL Final    MCH 05/02/2024 30.1  26.6 - 33.0 pg Final    MCHC 05/02/2024 32.7  31.5 - 35.7 g/dL Final    RDW 05/02/2024 12.7  12.3 - 15.4 % Final    Platelets 05/02/2024 351  140 - 450 10*3/mm3 Final    Neutrophil Rel % 05/02/2024 61.4  42.7 - 76.0 % Final    Lymphocyte Rel % 05/02/2024 27.4  19.6 - 45.3 % Final    Monocyte Rel % 05/02/2024 7.5  5.0 - 12.0 % Final    Eosinophil Rel % 05/02/2024 2.8  0.3 - 6.2 % Final    Basophil Rel % 05/02/2024 0.4  0.0 - 1.5 % Final    Neutrophils Absolute 05/02/2024 4.56  1.70 - 7.00 10*3/mm3 Final    Lymphocytes Absolute 05/02/2024 2.04  0.70 - 3.10 10*3/mm3 Final    Monocytes Absolute 05/02/2024 0.56  0.10 - 0.90 10*3/mm3 Final    Eosinophils Absolute 05/02/2024 0.21  0.00 - 0.40 10*3/mm3 Final    Basophils Absolute 05/02/2024 0.03  0.00 - 0.20 10*3/mm3 Final    Immature Granulocyte Rel % 05/02/2024 0.5  0.0 - 0.5 % Final    Immature Grans Absolute 05/02/2024 0.04  0.00 - 0.05 10*3/mm3 Final    nRBC  05/02/2024 0.0  0.0 - 0.2 /100 WBC Final    Total Cholesterol 05/02/2024 173  0 - 200 mg/dL Final    Comment: Cholesterol Reference Ranges  (U.S. Department of Health and Human Services ATP III  Classifications)  Desirable          <200 mg/dL  Borderline High    200-239 mg/dL  High Risk          >240 mg/dL  Triglyceride Reference Ranges  (U.S. Department of Health and Human Services ATP III  Classifications)  Normal           <150 mg/dL  Borderline High  150-199 mg/dL  High             200-499 mg/dL  Very High        >500 mg/dL  HDL Reference Ranges  (U.S. Department of Health and Human Services ATP III  Classifications)  Low     <40 mg/dl (major risk factor for CHD)  High    >60 mg/dl ('negative' risk factor for CHD)  LDL Reference Ranges  (U.S. Department of Health and Human Services ATP III  Classifications)  Optimal          <100 mg/dL  Near Optimal     100-129 mg/dL  Borderline High  130-159 mg/dL  High             160-189 mg/dL  Very High        >189 mg/dL      Triglycerides 05/02/2024 72  0 - 150 mg/dL Final    HDL Cholesterol 05/02/2024 61 (H)  40 - 60 mg/dL Final    VLDL Cholesterol Bhaskar 05/02/2024 14  5 - 40 mg/dL Final    LDL Chol Calc (NIH) 05/02/2024 98  0 - 100 mg/dL Final    Chol/HDL Ratio 05/02/2024 2.84   Final    Glucose 05/02/2024 78  65 - 99 mg/dL Final    BUN 05/02/2024 13  6 - 20 mg/dL Final    Creatinine 05/02/2024 1.21 (H)  0.57 - 1.00 mg/dL Final    EGFR Result 05/02/2024 59.0 (L)  >60.0 mL/min/1.73 Final    Comment: GFR Normal >60  Chronic Kidney Disease <60  Kidney Failure <15      BUN/Creatinine Ratio 05/02/2024 10.7  7.0 - 25.0 Final    Sodium 05/02/2024 141  136 - 145 mmol/L Final    Potassium 05/02/2024 4.4  3.5 - 5.2 mmol/L Final    Chloride 05/02/2024 106  98 - 107 mmol/L Final    Total CO2 05/02/2024 26.9  22.0 - 29.0 mmol/L Final    Calcium 05/02/2024 9.0  8.6 - 10.5 mg/dL Final    Total Protein 05/02/2024 6.6  6.0 - 8.5 g/dL Final    Albumin 05/02/2024 4.2  3.5 - 5.2 g/dL Final     Globulin 05/02/2024 2.4  gm/dL Final    A/G Ratio 05/02/2024 1.8  g/dL Final    Total Bilirubin 05/02/2024 0.4  0.0 - 1.2 mg/dL Final    Alkaline Phosphatase 05/02/2024 72  39 - 117 U/L Final    AST (SGOT) 05/02/2024 15  1 - 32 U/L Final    ALT (SGPT) 05/02/2024 9  1 - 33 U/L Final    TSH 05/02/2024 3.570  0.270 - 4.200 uIU/mL Final      Assessment & Plan   Diagnoses and all orders for this visit:    1. Health care maintenance (Primary)    2. CRI (chronic renal insufficiency), stage 1    3. Tingling    4. Nausea  -     ondansetron ODT (ZOFRAN-ODT) 4 MG disintegrating tablet; Place 1 tablet on the tongue Every 8 (Eight) Hours As Needed for Nausea or Vomiting.  Dispense: 30 tablet; Refill: 1                 1. Preventive counseling- continue to work on healthy diet and exercise   2. Foot tingling- if needed try the good foot store, notify if symptoms persist or worsen.   3. CRI- continue with nephrology, hydrate avoid all NSAIDs    GYN- UTD per patient

## 2024-10-07 ENCOUNTER — OFFICE VISIT (OUTPATIENT)
Dept: INTERNAL MEDICINE | Facility: CLINIC | Age: 39
End: 2024-10-07
Payer: COMMERCIAL

## 2024-10-07 VITALS
HEART RATE: 72 BPM | BODY MASS INDEX: 23.82 KG/M2 | DIASTOLIC BLOOD PRESSURE: 80 MMHG | HEIGHT: 65 IN | OXYGEN SATURATION: 99 % | WEIGHT: 143 LBS | SYSTOLIC BLOOD PRESSURE: 102 MMHG

## 2024-10-07 DIAGNOSIS — R30.0 DYSURIA: Primary | ICD-10-CM

## 2024-10-07 LAB
BILIRUB BLD-MCNC: NEGATIVE MG/DL
CLARITY, POC: ABNORMAL
COLOR UR: ABNORMAL
EXPIRATION DATE: ABNORMAL
GLUCOSE UR STRIP-MCNC: NEGATIVE MG/DL
KETONES UR QL: NEGATIVE
LEUKOCYTE EST, POC: NEGATIVE
Lab: ABNORMAL
NITRITE UR-MCNC: NEGATIVE MG/ML
PH UR: 6.5 [PH] (ref 5–8)
PROT UR STRIP-MCNC: NEGATIVE MG/DL
RBC # UR STRIP: NEGATIVE /UL
SP GR UR: 1.02 (ref 1–1.03)
UROBILINOGEN UR QL: ABNORMAL

## 2024-10-07 PROCEDURE — 99213 OFFICE O/P EST LOW 20 MIN: CPT | Performed by: NURSE PRACTITIONER

## 2024-10-07 PROCEDURE — 81003 URINALYSIS AUTO W/O SCOPE: CPT | Performed by: NURSE PRACTITIONER

## 2024-10-07 RX ORDER — CIPROFLOXACIN 250 MG/1
250 TABLET, FILM COATED ORAL 2 TIMES DAILY
Qty: 10 TABLET | Refills: 0 | Status: SHIPPED | OUTPATIENT
Start: 2024-10-07

## 2024-10-07 NOTE — PROGRESS NOTES
Subjective   Cecille Devi is a 39 y.o. female. Patient is here today for   Chief Complaint   Patient presents with    Urinary Tract Infection    Dysuria    Abdominal Cramping   .    History of Present Illness   C/o urgency to urinate for a few days associated with abdominal cramping. She does have a history of IC. She took Azo which helped some.     The following portions of the patient's history were reviewed and updated as appropriate: allergies, current medications, past family history, past medical history, past social history, past surgical history and problem list.    Review of Systems    Objective   Vitals:    10/07/24 1339   BP: 102/80   Pulse: 72   SpO2: 99%     Body mass index is 23.8 kg/m².  Physical Exam  Vitals and nursing note reviewed.   Constitutional:       Appearance: Normal appearance. She is well-developed.   Cardiovascular:      Rate and Rhythm: Normal rate and regular rhythm.      Heart sounds: Normal heart sounds.   Pulmonary:      Effort: Pulmonary effort is normal.      Breath sounds: Normal breath sounds.   Skin:     General: Skin is warm and dry.   Neurological:      Mental Status: She is alert and oriented to person, place, and time.   Psychiatric:         Speech: Speech normal.         Behavior: Behavior normal.         Thought Content: Thought content normal.       Results for orders placed or performed in visit on 10/07/24   POCT urinalysis dipstick, automated    Specimen: Urine   Result Value Ref Range    Color Dark Yellow Yellow, Straw, Dark Yellow, Shira    Clarity, UA Slightly Cloudy (A) Clear    Specific Gravity  1.025 1.005 - 1.030    pH, Urine 6.5 5.0 - 8.0    Leukocytes Negative Negative    Nitrite, UA Negative Negative    Protein, POC Negative Negative mg/dL    Glucose, UA Negative Negative mg/dL    Ketones, UA Negative Negative    Urobilinogen, UA 0.2 E.U./dL Normal, 0.2 E.U./dL    Bilirubin Negative Negative    Blood, UA Negative Negative    Lot Number [id number]      Expiration Date 7,312,025        Assessment & Plan   Diagnoses and all orders for this visit:    1. Dysuria (Primary)  -     POCT urinalysis dipstick, automated  -     Urine Culture - Urine, Urine, Clean Catch  -     ciprofloxacin (Cipro) 250 MG tablet; Take 1 tablet by mouth 2 (Two) Times a Day.  Dispense: 10 tablet; Refill: 0      Patient will be treated with cipro. Increase water intake. Will call with urine culture results.

## 2024-10-09 LAB
BACTERIA UR CULT: NORMAL
BACTERIA UR CULT: NORMAL

## 2024-11-30 DIAGNOSIS — J45.20 MILD INTERMITTENT REACTIVE AIRWAY DISEASE WITHOUT COMPLICATION: ICD-10-CM

## 2024-11-30 DIAGNOSIS — R05.9 COUGH, UNSPECIFIED TYPE: ICD-10-CM

## 2024-12-02 RX ORDER — ALBUTEROL SULFATE 90 UG/1
INHALANT RESPIRATORY (INHALATION)
Qty: 8.5 G | Refills: 1 | Status: SHIPPED | OUTPATIENT
Start: 2024-12-02

## 2025-01-21 ENCOUNTER — OFFICE VISIT (OUTPATIENT)
Dept: INTERNAL MEDICINE | Facility: CLINIC | Age: 40
End: 2025-01-21
Payer: COMMERCIAL

## 2025-01-21 VITALS
OXYGEN SATURATION: 100 % | HEIGHT: 65 IN | BODY MASS INDEX: 24.16 KG/M2 | WEIGHT: 145 LBS | DIASTOLIC BLOOD PRESSURE: 78 MMHG | HEART RATE: 80 BPM | SYSTOLIC BLOOD PRESSURE: 102 MMHG

## 2025-01-21 DIAGNOSIS — M25.512 ACUTE PAIN OF LEFT SHOULDER: ICD-10-CM

## 2025-01-21 DIAGNOSIS — R20.2 PARESTHESIA OF RIGHT FOOT: ICD-10-CM

## 2025-01-21 DIAGNOSIS — R06.02 SOB (SHORTNESS OF BREATH) ON EXERTION: Primary | ICD-10-CM

## 2025-01-21 PROBLEM — J45.21 MILD INTERMITTENT ASTHMA WITH ACUTE EXACERBATION: Status: ACTIVE | Noted: 2025-01-21

## 2025-01-21 LAB — D DIMER PPP FEU-MCNC: <0.27 MCGFEU/ML (ref 0–0.49)

## 2025-01-21 PROCEDURE — 99214 OFFICE O/P EST MOD 30 MIN: CPT | Performed by: NURSE PRACTITIONER

## 2025-01-21 PROCEDURE — 93000 ELECTROCARDIOGRAM COMPLETE: CPT | Performed by: NURSE PRACTITIONER

## 2025-01-21 RX ORDER — BUDESONIDE AND FORMOTEROL FUMARATE DIHYDRATE 80; 4.5 UG/1; UG/1
AEROSOL RESPIRATORY (INHALATION)
COMMUNITY
Start: 2024-12-02

## 2025-01-21 NOTE — PROGRESS NOTES
"Subjective   Cecille Devi is a 39 y.o. female.     History of Present Illness    The patient is here today with c/o SOB, calf aching.   Played a tennis match about 1 1/2 weeks ago, ended up getting sick in the middle of the night- vomited. Next body aches and fatigue. Monday felt ok- rested at home.     She had a hard time breathing last Sunday at her 2nd tennis match. She had a hard time catching her breath with some heaviness. She took her albuterol - maybe helped some, had the heart racing with it.     She has also been doing PT. Her right calf will ache and hurt- radiates to right lateral foot.     Left shoulder pain after picking up her son. Better with PT working on it.   No recent travel. Does sit at a desk. No hx of blood clots.     She went to see the allergist- they started her on the symbicort.     The following portions of the patient's history were reviewed and updated as appropriate: allergies, current medications, past family history, past medical history, past social history, past surgical history and problem list.    Review of Systems   Constitutional: Negative.    Respiratory:  Positive for shortness of breath. Negative for cough and wheezing.    Cardiovascular:  Negative for chest pain, palpitations and leg swelling.   Musculoskeletal:  Positive for arthralgias.   Psychiatric/Behavioral:  The patient is nervous/anxious.        Objective   Physical Exam  Constitutional:       Appearance: Normal appearance. She is well-developed.   Neck:      Thyroid: No thyromegaly.   Cardiovascular:      Rate and Rhythm: Normal rate and regular rhythm.      Heart sounds: Normal heart sounds.      Comments: L calf 14\"  R calf 13.5\"  Negative charlene sign  Pulmonary:      Effort: Pulmonary effort is normal.      Breath sounds: Normal breath sounds.   Musculoskeletal:      Cervical back: Normal range of motion and neck supple.   Lymphadenopathy:      Cervical: No cervical adenopathy.   Skin:     General: Skin is " warm and dry.   Neurological:      Mental Status: She is alert.   Psychiatric:         Behavior: Behavior normal.         Thought Content: Thought content normal.         Judgment: Judgment normal.         Vitals:    01/21/25 0853   BP: 102/78   Pulse: 80   SpO2: 100%     Body mass index is 24.13 kg/m².    Office Visit on 10/07/2024   Component Date Value Ref Range Status    Color 10/07/2024 Dark Yellow  Yellow, Straw, Dark Yellow, Shira Final    Clarity, UA 10/07/2024 Slightly Cloudy (A)  Clear Final    Specific Gravity  10/07/2024 1.025  1.005 - 1.030 Final    pH, Urine 10/07/2024 6.5  5.0 - 8.0 Final    Leukocytes 10/07/2024 Negative  Negative Final    Nitrite, UA 10/07/2024 Negative  Negative Final    Protein, POC 10/07/2024 Negative  Negative mg/dL Final    Glucose, UA 10/07/2024 Negative  Negative mg/dL Final    Ketones, UA 10/07/2024 Negative  Negative Final    Urobilinogen, UA 10/07/2024 0.2 E.U./dL  Normal, 0.2 E.U./dL Final    Bilirubin 10/07/2024 Negative  Negative Final    Blood, UA 10/07/2024 Negative  Negative Final    Lot Number 10/07/2024 [id number]   Final    Expiration Date 10/07/2024 7,312,025   Final    Urine Culture 10/07/2024 Final report   Final    Result 1 10/07/2024 Comment   Final    Comment: Culture shows less than 10,000 colony forming units of bacteria per  milliliter of urine. This colony count is not generally considered  to be clinically significant.        Assessment & Plan   Diagnoses and all orders for this visit:    1. SOB (shortness of breath) on exertion (Primary)  -     ECG 12 Lead  -     CBC & Differential  -     Basic Metabolic Panel  -     D-dimer, Quantitative  -     Vitamin B12 & Folate    2. Acute pain of left shoulder  -     ECG 12 Lead  -     CBC & Differential  -     Basic Metabolic Panel  -     D-dimer, Quantitative  -     Vitamin B12 & Folate    3. Paresthesia of right foot  -     CBC & Differential  -     Basic Metabolic Panel  -     D-dimer, Quantitative  -      Vitamin B12 & Folate          ECG 12 Lead    Date/Time: 1/21/2025 9:23 AM  Performed by: Gita Lorenzo APRN    Authorized by: Gita Lorenzo APRN  Comparison: compared with previous ECG from 5/30/2020  Similar to previous ECG  Rhythm: sinus rhythm  Rate: normal  ST Segments: ST segments normal  T Waves: T waves normal  QRS axis: normal  Other findings: left atrial abnormality    Clinical impression: abnormal EKG                 1. SOB/left shoulder pain- asthma and muscle strain likely, r/o DVT and cardiac issues, check EKG and d-dimer  She will try her albuterol inhaler next time this symptom occurs, if symptoms persist go to ER.   2. Pedal paresthesia - pulses intact, color is WNL, movement is WNL, ?trapped muscle of calf- continue with PT, pending d-dimer

## 2025-01-22 LAB
BASOPHILS # BLD AUTO: 0.1 X10E3/UL (ref 0–0.2)
BASOPHILS NFR BLD AUTO: 1 %
BUN SERPL-MCNC: 12 MG/DL (ref 6–20)
BUN/CREAT SERPL: 11 (ref 9–23)
CALCIUM SERPL-MCNC: 9.4 MG/DL (ref 8.7–10.2)
CHLORIDE SERPL-SCNC: 106 MMOL/L (ref 96–106)
CO2 SERPL-SCNC: 23 MMOL/L (ref 20–29)
CREAT SERPL-MCNC: 1.13 MG/DL (ref 0.57–1)
D DIMER PPP FEU-MCNC: NORMAL MG/L FEU
EGFRCR SERPLBLD CKD-EPI 2021: 64 ML/MIN/1.73
EOSINOPHIL # BLD AUTO: 0.2 X10E3/UL (ref 0–0.4)
EOSINOPHIL NFR BLD AUTO: 3 %
ERYTHROCYTE [DISTWIDTH] IN BLOOD BY AUTOMATED COUNT: 12.3 % (ref 11.7–15.4)
FOLATE SERPL-MCNC: 9.4 NG/ML
GLUCOSE SERPL-MCNC: 75 MG/DL (ref 70–99)
HCT VFR BLD AUTO: 40.1 % (ref 34–46.6)
HGB BLD-MCNC: 13.1 G/DL (ref 11.1–15.9)
IMM GRANULOCYTES # BLD AUTO: 0 X10E3/UL (ref 0–0.1)
IMM GRANULOCYTES NFR BLD AUTO: 0 %
LYMPHOCYTES # BLD AUTO: 1.8 X10E3/UL (ref 0.7–3.1)
LYMPHOCYTES NFR BLD AUTO: 27 %
MCH RBC QN AUTO: 30.2 PG (ref 26.6–33)
MCHC RBC AUTO-ENTMCNC: 32.7 G/DL (ref 31.5–35.7)
MCV RBC AUTO: 92 FL (ref 79–97)
MONOCYTES # BLD AUTO: 0.5 X10E3/UL (ref 0.1–0.9)
MONOCYTES NFR BLD AUTO: 7 %
NEUTROPHILS # BLD AUTO: 4.1 X10E3/UL (ref 1.4–7)
NEUTROPHILS NFR BLD AUTO: 62 %
PLATELET # BLD AUTO: 407 X10E3/UL (ref 150–450)
POTASSIUM SERPL-SCNC: 5.2 MMOL/L (ref 3.5–5.2)
RBC # BLD AUTO: 4.34 X10E6/UL (ref 3.77–5.28)
SODIUM SERPL-SCNC: 140 MMOL/L (ref 134–144)
SPECIMEN STATUS: NORMAL
VIT B12 SERPL-MCNC: 433 PG/ML (ref 232–1245)
WBC # BLD AUTO: 6.7 X10E3/UL (ref 3.4–10.8)

## 2025-01-24 ENCOUNTER — PATIENT ROUNDING (BHMG ONLY) (OUTPATIENT)
Dept: INTERNAL MEDICINE | Facility: CLINIC | Age: 40
End: 2025-01-24
Payer: COMMERCIAL

## 2025-02-10 DIAGNOSIS — R94.31 ABNORMAL EKG: Primary | ICD-10-CM

## 2025-02-19 DIAGNOSIS — R06.02 SOB (SHORTNESS OF BREATH) ON EXERTION: Primary | ICD-10-CM

## 2025-02-19 DIAGNOSIS — R94.31 ABNORMAL EKG: ICD-10-CM

## 2025-03-07 ENCOUNTER — OFFICE VISIT (OUTPATIENT)
Dept: CARDIOLOGY | Facility: CLINIC | Age: 40
End: 2025-03-07
Payer: COMMERCIAL

## 2025-03-07 VITALS
BODY MASS INDEX: 24.49 KG/M2 | DIASTOLIC BLOOD PRESSURE: 78 MMHG | HEART RATE: 89 BPM | RESPIRATION RATE: 18 BRPM | SYSTOLIC BLOOD PRESSURE: 106 MMHG | OXYGEN SATURATION: 98 % | WEIGHT: 147 LBS | HEIGHT: 65 IN

## 2025-03-07 DIAGNOSIS — R07.2 PRECORDIAL PAIN: Primary | ICD-10-CM

## 2025-03-07 PROCEDURE — 99204 OFFICE O/P NEW MOD 45 MIN: CPT | Performed by: INTERNAL MEDICINE

## 2025-03-07 PROCEDURE — 93000 ELECTROCARDIOGRAM COMPLETE: CPT | Performed by: INTERNAL MEDICINE

## 2025-03-07 RX ORDER — FEXOFENADINE HCL 180 MG/1
180 TABLET ORAL DAILY
COMMUNITY

## 2025-03-07 NOTE — PROGRESS NOTES
[facility]      Patient Name: Cecille Devi  :1985  Age: 40 y.o.  Encounter Provider:  Dante Dalton Jr, MD      Chief Complaint: Initial evaluation of precordial pain      HPI  Cecille Devi is a 40 y.o. female no significant past medical history other than depression/anxiety who presents for evaluation of precordial pain.  She has been experiencing atypical chest discomfort over the last few weeks to months.  She describes this as a migratory transient discomfort sometimes in the anterior aspect of the thorax and at times in the left flank.  She describes it as an ache or pressure sensation which can be quite variable.  There is no relationship to physical activity.  No associated shortness of air, diaphoresis, nausea or vomiting.  She denies orthopnea, PND or edema.  Blood pressure and heart rate are well-controlled today in clinic.  EKG shows sinus rhythm with nonspecific repolarization abnormalities.  She has no family history of premature coronary artery disease or sudden cardiac death.  She does have a child with Down syndrome that has an associated AV canal defects.  She is a lifelong non-smoker who drinks socially and denies illicit drug use.  She plays tennis once per week and has no discomfort during exercise.        The following portions of the patient's history were reviewed and updated as appropriate: allergies, current medications, past family history, past medical history, past social history, past surgical history and problem list.      Review of Systems   Constitutional: Negative for chills and fever.   HENT:  Negative for hoarse voice and sore throat.    Eyes:  Negative for double vision and photophobia.   Cardiovascular:  Positive for chest pain. Negative for leg swelling, near-syncope, orthopnea, palpitations, paroxysmal nocturnal dyspnea and syncope.   Respiratory:  Negative for cough and wheezing.    Skin:  Negative for poor wound healing and rash.  "  Musculoskeletal:  Negative for arthritis and joint swelling.   Gastrointestinal:  Negative for bloating, abdominal pain, hematemesis and hematochezia.   Neurological:  Negative for dizziness and focal weakness.   Psychiatric/Behavioral:  Negative for depression and suicidal ideas.        OBJECTIVE:   Vital Signs  Vitals:    03/07/25 0817   BP: 106/78   Pulse: 89   Resp: 18   SpO2: 98%     Estimated body mass index is 24.46 kg/m² as calculated from the following:    Height as of this encounter: 165.1 cm (65\").    Weight as of this encounter: 66.7 kg (147 lb).    Vitals reviewed.   Constitutional:       Appearance: Healthy appearance. Not in distress.   Neck:      Vascular: No JVR. JVD normal.   Pulmonary:      Effort: Pulmonary effort is normal.      Breath sounds: Normal breath sounds. No wheezing. No rhonchi. No rales.   Chest:      Chest wall: Not tender to palpatation.   Cardiovascular:      PMI at left midclavicular line. Normal rate. Regular rhythm. Normal S1. Normal S2.       Murmurs: There is no murmur.      No gallop.  No click. No rub.   Pulses:     Intact distal pulses.   Edema:     Peripheral edema absent.   Abdominal:      General: Bowel sounds are normal.      Palpations: Abdomen is soft.      Tenderness: There is no abdominal tenderness.   Musculoskeletal: Normal range of motion.         General: No tenderness. Skin:     General: Skin is warm and dry.   Neurological:      General: No focal deficit present.      Mental Status: Alert and oriented to person, place and time.           ECG 12 Lead    Date/Time: 3/7/2025 8:45 AM  Performed by: Dante Dalton Jr., MD    Authorized by: Dante Dalton Jr., MD  Comparison: compared with previous ECG from 5/12/2020  Similar to previous ECG  Rhythm: sinus rhythm  Other findings: non-specific ST-T wave changes    Clinical impression: non-specific ECG          Lipid Panel          5/2/2024    07:55   Lipid Panel   Total Cholesterol 173    Triglycerides 72    HDL " Cholesterol 61    VLDL Cholesterol 14    LDL Cholesterol  98         BUN   Date Value Ref Range Status   01/21/2025 12 6 - 20 mg/dL Final   05/30/2020 17 6 - 20 mg/dL Final     Creatinine   Date Value Ref Range Status   01/21/2025 1.13 (H) 0.57 - 1.00 mg/dL Final   05/30/2020 1.37 (H) 0.57 - 1.00 mg/dL Final     Potassium   Date Value Ref Range Status   01/21/2025 5.2 3.5 - 5.2 mmol/L Final   05/30/2020 4.0 3.5 - 5.2 mmol/L Final     ALT (SGPT)   Date Value Ref Range Status   05/02/2024 9 1 - 33 U/L Final   05/30/2020 10 1 - 33 U/L Final     AST (SGOT)   Date Value Ref Range Status   05/02/2024 15 1 - 32 U/L Final   05/30/2020 11 1 - 32 U/L Final           ASSESSMENT:     40-year-old female presents for evaluation of precordial pain    PLAN OF CARE:     Precordial pain  -noncardiac characteristics.  Echocardiogram was ordered by PCP but delayed until this visit.  We will get her scheduled for an echocardiogram.  If there is no significant structural heart disease I do not feel there is any need for ischemic workup.  She will monitor symptoms during exercise.  Follow diagnostic testing results for further treatment recommendations.  Depression/anxiety    Return to clinic 6 months             Discharge Medications            Accurate as of March 7, 2025  8:43 AM. If you have any questions, ask your nurse or doctor.                Continue These Medications        Instructions Start Date   acetaminophen 500 MG tablet  Commonly known as: TYLENOL   500 mg, Every 6 Hours PRN      albuterol sulfate  (90 Base) MCG/ACT inhaler  Commonly known as: PROVENTIL HFA;VENTOLIN HFA;PROAIR HFA   INHALE 2 PUFFS EVERY 4 HOURS AS NEEDED FOR SHORTNESS OF AIR      budesonide-formoterol 80-4.5 MCG/ACT inhaler  Commonly known as: SYMBICORT   INHALE 2 PUFFS VIA SPACER EVERY 12 HOURS. RINSE MOUTH AFTER USE      buPROPion  MG 24 hr tablet  Commonly known as: WELLBUTRIN XL   1 tablet, Daily      fexofenadine 180 MG tablet  Commonly  known as: ALLEGRA   180 mg, Daily      Nortrel 1/35 (28) 1-35 MG-MCG per tablet  Generic drug: norethindrone-ethinyl estradiol   1 tablet, Daily               Thank you for allowing me to participate in the care of your patient,      Sincerely,   Dante Dalton Jr, MD  [facility]  03/07/25  08:43 EST

## 2025-03-10 ENCOUNTER — HOSPITAL ENCOUNTER (OUTPATIENT)
Dept: CARDIOLOGY | Facility: HOSPITAL | Age: 40
Discharge: HOME | End: 2025-03-10
Admitting: NURSE PRACTITIONER
Payer: COMMERCIAL

## 2025-03-10 VITALS
HEART RATE: 60 BPM | DIASTOLIC BLOOD PRESSURE: 70 MMHG | SYSTOLIC BLOOD PRESSURE: 104 MMHG | WEIGHT: 147 LBS | HEIGHT: 65 IN | BODY MASS INDEX: 24.49 KG/M2

## 2025-03-10 DIAGNOSIS — R94.31 ABNORMAL EKG: ICD-10-CM

## 2025-03-10 LAB
AORTIC ARCH: 2.5 CM
ASCENDING AORTA: 2.5 CM
AV MEAN PRESS GRAD SYS DOP V1V2: 4 MMHG
AV VMAX SYS DOP: 140 CM/SEC
BH CV ECHO MEAS - ACS: 1.62 CM
BH CV ECHO MEAS - AO MAX PG: 7.8 MMHG
BH CV ECHO MEAS - AO ROOT DIAM: 2.26 CM
BH CV ECHO MEAS - AO V2 VTI: 28.4 CM
BH CV ECHO MEAS - AVA(I,D): 1.63 CM2
BH CV ECHO MEAS - EDV(CUBED): 103.8 ML
BH CV ECHO MEAS - EDV(MOD-SP2): 67 ML
BH CV ECHO MEAS - EDV(MOD-SP4): 51 ML
BH CV ECHO MEAS - EF(MOD-SP2): 53.7 %
BH CV ECHO MEAS - EF(MOD-SP4): 51 %
BH CV ECHO MEAS - ESV(CUBED): 27.7 ML
BH CV ECHO MEAS - ESV(MOD-SP2): 31 ML
BH CV ECHO MEAS - ESV(MOD-SP4): 25 ML
BH CV ECHO MEAS - FS: 35.7 %
BH CV ECHO MEAS - IVS/LVPW: 0.75 CM
BH CV ECHO MEAS - IVSD: 0.6 CM
BH CV ECHO MEAS - LAT PEAK E' VEL: 8.7 CM/SEC
BH CV ECHO MEAS - LV DIASTOLIC VOL/BSA (35-75): 29.4 CM2
BH CV ECHO MEAS - LV MASS(C)D: 103.1 GRAMS
BH CV ECHO MEAS - LV MAX PG: 3.2 MMHG
BH CV ECHO MEAS - LV MEAN PG: 2 MMHG
BH CV ECHO MEAS - LV SYSTOLIC VOL/BSA (12-30): 14.4 CM2
BH CV ECHO MEAS - LV V1 MAX: 89.2 CM/SEC
BH CV ECHO MEAS - LV V1 VTI: 17.1 CM
BH CV ECHO MEAS - LVIDD: 4.7 CM
BH CV ECHO MEAS - LVIDS: 3 CM
BH CV ECHO MEAS - LVOT AREA: 2.7 CM2
BH CV ECHO MEAS - LVOT DIAM: 1.86 CM
BH CV ECHO MEAS - LVPWD: 0.8 CM
BH CV ECHO MEAS - MED PEAK E' VEL: 6.9 CM/SEC
BH CV ECHO MEAS - MR MAX PG: 34.4 MMHG
BH CV ECHO MEAS - MR MAX VEL: 293.2 CM/SEC
BH CV ECHO MEAS - MV A DUR: 0.09 SEC
BH CV ECHO MEAS - MV A MAX VEL: 56.9 CM/SEC
BH CV ECHO MEAS - MV DEC SLOPE: 194.3 CM/SEC2
BH CV ECHO MEAS - MV DEC TIME: 0.24 SEC
BH CV ECHO MEAS - MV E MAX VEL: 54.2 CM/SEC
BH CV ECHO MEAS - MV E/A: 0.95
BH CV ECHO MEAS - MV MAX PG: 2.23 MMHG
BH CV ECHO MEAS - MV MEAN PG: 1.16 MMHG
BH CV ECHO MEAS - MV P1/2T: 108.5 MSEC
BH CV ECHO MEAS - MV V2 VTI: 23.9 CM
BH CV ECHO MEAS - MVA(P1/2T): 2.03 CM2
BH CV ECHO MEAS - MVA(VTI): 1.94 CM2
BH CV ECHO MEAS - PA ACC TIME: 0.12 SEC
BH CV ECHO MEAS - PA V2 MAX: 105.9 CM/SEC
BH CV ECHO MEAS - PI END-D VEL: 76 CM/SEC
BH CV ECHO MEAS - PULM A REVS DUR: 0.1 SEC
BH CV ECHO MEAS - PULM A REVS VEL: 34.5 CM/SEC
BH CV ECHO MEAS - PULM DIAS VEL: 50.9 CM/SEC
BH CV ECHO MEAS - PULM S/D: 0.8
BH CV ECHO MEAS - PULM SYS VEL: 40.5 CM/SEC
BH CV ECHO MEAS - QP/QS: 0.7
BH CV ECHO MEAS - RAP SYSTOLE: 3 MMHG
BH CV ECHO MEAS - RV MAX PG: 3.4 MMHG
BH CV ECHO MEAS - RV V1 MAX: 92.4 CM/SEC
BH CV ECHO MEAS - RV V1 VTI: 20.4 CM
BH CV ECHO MEAS - RVOT DIAM: 1.43 CM
BH CV ECHO MEAS - RVSP: 19.1 MMHG
BH CV ECHO MEAS - SUP REN AO DIAM: 1.8 CM
BH CV ECHO MEAS - SV(LVOT): 46.4 ML
BH CV ECHO MEAS - SV(MOD-SP2): 36 ML
BH CV ECHO MEAS - SV(MOD-SP4): 26 ML
BH CV ECHO MEAS - SV(RVOT): 32.6 ML
BH CV ECHO MEAS - SVI(LVOT): 26.7 ML/M2
BH CV ECHO MEAS - SVI(MOD-SP2): 20.7 ML/M2
BH CV ECHO MEAS - SVI(MOD-SP4): 15 ML/M2
BH CV ECHO MEAS - TAPSE (>1.6): 1.96 CM
BH CV ECHO MEAS - TR MAX PG: 16.1 MMHG
BH CV ECHO MEAS - TR MAX VEL: 200.9 CM/SEC
BH CV ECHO MEASUREMENTS AVERAGE E/E' RATIO: 6.95
BH CV XLRA - RV BASE: 2.6 CM
BH CV XLRA - RV LENGTH: 5.4 CM
BH CV XLRA - RV MID: 2.04 CM
BH CV XLRA - TDI S': 13.4 CM/SEC
LEFT ATRIUM VOLUME INDEX: 13.2 ML/M2
LV EF BIPLANE MOD: 52.3 %
SINUS: 2.37 CM
STJ: 2.38 CM

## 2025-03-10 PROCEDURE — 93306 TTE W/DOPPLER COMPLETE: CPT | Performed by: INTERNAL MEDICINE

## 2025-03-10 PROCEDURE — 93306 TTE W/DOPPLER COMPLETE: CPT

## 2025-06-25 DIAGNOSIS — Z00.00 HEALTH CARE MAINTENANCE: Primary | ICD-10-CM

## 2025-06-28 LAB
ALBUMIN SERPL-MCNC: 4.3 G/DL (ref 3.5–5.2)
ALBUMIN/GLOB SERPL: 1.7 G/DL
ALP SERPL-CCNC: 71 U/L (ref 39–117)
ALT SERPL-CCNC: 8 U/L (ref 1–33)
AST SERPL-CCNC: 14 U/L (ref 1–32)
BASOPHILS # BLD AUTO: 0.08 10*3/MM3 (ref 0–0.2)
BASOPHILS NFR BLD AUTO: 1.2 % (ref 0–1.5)
BILIRUB SERPL-MCNC: 0.4 MG/DL (ref 0–1.2)
BUN SERPL-MCNC: 13 MG/DL (ref 6–20)
BUN/CREAT SERPL: 11.1 (ref 7–25)
CALCIUM SERPL-MCNC: 9.1 MG/DL (ref 8.6–10.5)
CHLORIDE SERPL-SCNC: 105 MMOL/L (ref 98–107)
CHOLEST SERPL-MCNC: 179 MG/DL (ref 0–200)
CHOLEST/HDLC SERPL: 3.31 {RATIO}
CO2 SERPL-SCNC: 24 MMOL/L (ref 22–29)
CREAT SERPL-MCNC: 1.17 MG/DL (ref 0.57–1)
EGFRCR SERPLBLD CKD-EPI 2021: 61 ML/MIN/1.73
EOSINOPHIL # BLD AUTO: 0.15 10*3/MM3 (ref 0–0.4)
EOSINOPHIL NFR BLD AUTO: 2.2 % (ref 0.3–6.2)
ERYTHROCYTE [DISTWIDTH] IN BLOOD BY AUTOMATED COUNT: 12.4 % (ref 12.3–15.4)
GLOBULIN SER CALC-MCNC: 2.6 GM/DL
GLUCOSE SERPL-MCNC: 82 MG/DL (ref 65–99)
HCT VFR BLD AUTO: 40.7 % (ref 34–46.6)
HDLC SERPL-MCNC: 54 MG/DL (ref 40–60)
HGB BLD-MCNC: 13.4 G/DL (ref 12–15.9)
IMM GRANULOCYTES # BLD AUTO: 0.01 10*3/MM3 (ref 0–0.05)
IMM GRANULOCYTES NFR BLD AUTO: 0.1 % (ref 0–0.5)
LDLC SERPL CALC-MCNC: 111 MG/DL (ref 0–100)
LYMPHOCYTES # BLD AUTO: 1.88 10*3/MM3 (ref 0.7–3.1)
LYMPHOCYTES NFR BLD AUTO: 27.8 % (ref 19.6–45.3)
MCH RBC QN AUTO: 30.5 PG (ref 26.6–33)
MCHC RBC AUTO-ENTMCNC: 32.9 G/DL (ref 31.5–35.7)
MCV RBC AUTO: 92.5 FL (ref 79–97)
MONOCYTES # BLD AUTO: 0.55 10*3/MM3 (ref 0.1–0.9)
MONOCYTES NFR BLD AUTO: 8.1 % (ref 5–12)
NEUTROPHILS # BLD AUTO: 4.09 10*3/MM3 (ref 1.7–7)
NEUTROPHILS NFR BLD AUTO: 60.6 % (ref 42.7–76)
NRBC BLD AUTO-RTO: 0 /100 WBC (ref 0–0.2)
PLATELET # BLD AUTO: 347 10*3/MM3 (ref 140–450)
POTASSIUM SERPL-SCNC: 4.7 MMOL/L (ref 3.5–5.2)
PROT SERPL-MCNC: 6.9 G/DL (ref 6–8.5)
RBC # BLD AUTO: 4.4 10*6/MM3 (ref 3.77–5.28)
SODIUM SERPL-SCNC: 137 MMOL/L (ref 136–145)
TRIGL SERPL-MCNC: 76 MG/DL (ref 0–150)
TSH SERPL DL<=0.005 MIU/L-ACNC: 3.02 UIU/ML (ref 0.27–4.2)
VLDLC SERPL CALC-MCNC: 14 MG/DL (ref 5–40)
WBC # BLD AUTO: 6.76 10*3/MM3 (ref 3.4–10.8)

## 2025-07-03 ENCOUNTER — OFFICE VISIT (OUTPATIENT)
Dept: INTERNAL MEDICINE | Facility: CLINIC | Age: 40
End: 2025-07-03
Payer: COMMERCIAL

## 2025-07-03 VITALS
BODY MASS INDEX: 22.61 KG/M2 | SYSTOLIC BLOOD PRESSURE: 96 MMHG | WEIGHT: 135.7 LBS | HEIGHT: 65 IN | DIASTOLIC BLOOD PRESSURE: 60 MMHG | TEMPERATURE: 98.4 F | OXYGEN SATURATION: 99 % | HEART RATE: 94 BPM

## 2025-07-03 DIAGNOSIS — N18.1 CRI (CHRONIC RENAL INSUFFICIENCY), STAGE 1: ICD-10-CM

## 2025-07-03 DIAGNOSIS — Z00.00 HEALTH CARE MAINTENANCE: Primary | ICD-10-CM

## 2025-07-03 DIAGNOSIS — M79.622 PAIN IN LEFT AXILLA: ICD-10-CM

## 2025-07-03 PROBLEM — N32.81 OAB (OVERACTIVE BLADDER): Status: ACTIVE | Noted: 2025-07-03

## 2025-07-03 PROCEDURE — 99395 PREV VISIT EST AGE 18-39: CPT | Performed by: NURSE PRACTITIONER

## 2025-07-03 NOTE — PROGRESS NOTES
Subjective   Cecille Devi is a 40 y.o. female.     History of Present Illness   The patient is here today for CPE and lab work F/U. She is feeling well.     They are moving from [facility] to [place]. [facility], [place] will be going to [place].     Mentioned to her OB left axillary dull ache. They are watching for now, she is to return if this continues. It comes and goes intermittently.   OAB- has had eval with GYN, has not tried pelvic PT yet. Takes azo bladder with good relief.     Asthma- well controlled, not needing inhaler now.     CRI- UTD with nephrology     BMI is within normal parameters. No other follow-up for BMI required.     The following portions of the patient's history were reviewed and updated as appropriate: allergies, current medications, past family history, past medical history, past social history, past surgical history and problem list.    Review of Systems   Constitutional:  Negative for chills, fatigue and fever.   HENT:  Negative for ear pain, rhinorrhea and sore throat.    Eyes: Negative.    Respiratory:  Negative for cough and shortness of breath.    Cardiovascular: Negative.    Gastrointestinal: Negative.    Endocrine: Negative for cold intolerance and heat intolerance.   Genitourinary:  Positive for frequency and urgency. Negative for breast discharge, breast lump, breast pain, difficulty urinating, dyspareunia, dysuria, flank pain, genital sores, hematuria and pelvic pain.   Musculoskeletal: Negative.    Skin: Negative.    Allergic/Immunologic: Negative for environmental allergies and food allergies.   Neurological: Negative.    Hematological: Negative.    Psychiatric/Behavioral:  Positive for stress. Negative for dysphoric mood and suicidal ideas. The patient is not nervous/anxious.        Objective   Physical Exam  Constitutional:       Appearance: Normal appearance. She is well-developed.      Comments: Body mass index is 22.58  kg/m².     HENT:      Right Ear: Hearing, tympanic membrane, ear canal and external ear normal.      Left Ear: Hearing, tympanic membrane, ear canal and external ear normal.   Eyes:      General: Lids are normal.      Conjunctiva/sclera: Conjunctivae normal.      Pupils: Pupils are equal, round, and reactive to light.   Neck:      Thyroid: No thyroid mass or thyromegaly.      Vascular: No carotid bruit.      Trachea: Trachea normal.   Cardiovascular:      Rate and Rhythm: Normal rate and regular rhythm.      Pulses: Normal pulses.      Heart sounds: Normal heart sounds.   Pulmonary:      Effort: Pulmonary effort is normal.      Breath sounds: Normal breath sounds.   Abdominal:      General: Bowel sounds are normal.      Palpations: Abdomen is soft.      Tenderness: There is no abdominal tenderness.   Musculoskeletal:         General: Normal range of motion.      Cervical back: Normal range of motion.   Lymphadenopathy:      Cervical: No cervical adenopathy.      Upper Body:      Right upper body: No supraclavicular adenopathy.      Left upper body: No supraclavicular adenopathy.      Lower Body: No right inguinal adenopathy. No left inguinal adenopathy.   Skin:     General: Skin is warm and dry.   Neurological:      Mental Status: She is alert and oriented to person, place, and time.      GCS: GCS eye subscore is 4. GCS verbal subscore is 5. GCS motor subscore is 6.      Cranial Nerves: No cranial nerve deficit.      Sensory: No sensory deficit.      Deep Tendon Reflexes:      Reflex Scores:       Patellar reflexes are 2+ on the right side and 2+ on the left side.  Psychiatric:         Speech: Speech normal.         Behavior: Behavior normal. Behavior is cooperative.         Thought Content: Thought content normal.         Judgment: Judgment normal.         Vitals:    07/03/25 0800   BP: 96/60   Pulse: 94   Temp: 98.4 °F (36.9 °C)   SpO2: 99%     Body mass index is 22.58 kg/m².      Assessment & Plan   Diagnoses and  all orders for this visit:    1. Health care maintenance (Primary)    2. Pain in left axilla    3. CRI (chronic renal insufficiency), stage 1                 1. Preventative counseling- continue to eat healthy and exercise   2. Axillary pain- suggest notifying GYN of continued pain for imaging. She is a  so if this imaging is neg will suggest PT   3. CRI- continue to hydrate and avoid NSAIDs

## 2025-07-30 ENCOUNTER — APPOINTMENT (OUTPATIENT)
Dept: WOMENS IMAGING | Facility: HOSPITAL | Age: 40
End: 2025-07-30
Payer: COMMERCIAL

## 2025-07-30 PROCEDURE — 76642 ULTRASOUND BREAST LIMITED: CPT | Performed by: RADIOLOGY

## 2025-07-30 PROCEDURE — 77062 BREAST TOMOSYNTHESIS BI: CPT | Performed by: RADIOLOGY

## 2025-07-30 PROCEDURE — 77066 DX MAMMO INCL CAD BI: CPT | Performed by: RADIOLOGY

## 2025-07-30 PROCEDURE — G0279 TOMOSYNTHESIS, MAMMO: HCPCS | Performed by: RADIOLOGY

## (undated) DEVICE — GLV SURG BIOGEL LTX PF 6 1/2

## (undated) DEVICE — Device

## (undated) DEVICE — PK HYSTSCPY D AND C 40

## (undated) DEVICE — SUT VIC PLS 0 CTX 36IN UD VCP978H

## (undated) DEVICE — TP SXN VACURETTE CRV 7MM

## (undated) DEVICE — NDL BLNT 18G 1 1/2IN

## (undated) DEVICE — ANTIBACTERIAL UNDYED BRAIDED (POLYGLACTIN 910), SYNTHETIC ABSORBABLE SUTURE: Brand: COATED VICRYL

## (undated) DEVICE — NDL SPINE 22G 31/2IN BLK

## (undated) DEVICE — SOL IRR H2O BTL 1000ML STRL

## (undated) DEVICE — CANSTR SXN UTER NO FLTR SURG

## (undated) DEVICE — 3M(TM) TEGADERM(TM) TRANSPARENT FILM DRESSING FRAME STYLE 1627: Brand: 3M™ TEGADERM™

## (undated) DEVICE — ST COL BERKELY TBG

## (undated) DEVICE — STRIP SKIN CLOSEURE PROXI 1X5IN

## (undated) DEVICE — KENDALL SCD EXPRESS SLEEVES, KNEE LENGTH, MEDIUM: Brand: KENDALL SCD

## (undated) DEVICE — PAD SANI MAXI W/ADHS SNG WRP 11IN

## (undated) DEVICE — TBG W FLTR FOR BERKELEY SYSTEM

## (undated) DEVICE — SACK GZ PERM

## (undated) DEVICE — HOSE BT TO BT VAC CURETTAGE SNGL PT USE

## (undated) DEVICE — KT ART BLD GAS QUICK DRAW